# Patient Record
Sex: MALE | Race: WHITE | NOT HISPANIC OR LATINO | Employment: PART TIME | URBAN - METROPOLITAN AREA
[De-identification: names, ages, dates, MRNs, and addresses within clinical notes are randomized per-mention and may not be internally consistent; named-entity substitution may affect disease eponyms.]

---
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
## 2024-01-12 ENCOUNTER — VIRTUAL VISIT (OUTPATIENT)
Dept: FAMILY MEDICINE | Facility: CLINIC | Age: 19
End: 2024-01-12
Payer: COMMERCIAL

## 2024-01-12 DIAGNOSIS — R68.89 EPISODES OF DECREASED ATTENTIVENESS: Primary | ICD-10-CM

## 2024-01-12 PROCEDURE — 99213 OFFICE O/P EST LOW 20 MIN: CPT | Mod: 95 | Performed by: FAMILY MEDICINE

## 2024-01-12 NOTE — PROGRESS NOTES
Jhoan is a 18 year old who is being evaluated via a billable video visit.      How would you like to obtain your AVS? Mail a copy  If the video visit is dropped, the invitation should be resent by: Text to cell phone: [phone]  Will anyone else be joining your video visit? No           Assessment & Plan     Episodes of decreased attentiveness  -Jhoan presented as a virtual visit, wanted to get medications for ADHD.  -He used to have ADHD diagnosis as a kid around 5 years of age, he is not sure what medication he was on.  -For the past 4 to 5 years, he is having issues focusing, racing thoughts, inattentiveness.  -Currently working, work involves computers, cutting trees and part-time in gym.  -Weed use every night.  Denied cigarette smoking or alcohol use.  -Denied anxiety or depression.  Not on any medications.    PLAN:  -Recommended ADHD evaluation first before medications.  -If evaluation comes back positive, he will schedule a visit to discuss medication options.    - Adult Mental Health  Referral; Future      15 minutes spent by me on the date of the encounter doing chart review, patient visit, and documentation        Nicotine/Tobacco Cessation:  He reports that he has been smoking other. He has never used smokeless tobacco.          Dwain Aleman MD  [facility]    Edita Staples is a 18 year old, presenting for the following health issues:  A.D.H.D        1/12/2024    10:43 AM   Additional Questions   Roomed by Liza       History of Present Illness       Reason for visit:  ADHD  Symptom onset:  More than a month  Symptoms include:  Cannot focus on more than one thing, forgetful while talking, switching subjects mid sentence, cannot retain information, mind racing 24/7, cannot sleep takes about 1-1.5 hours to fall asleep, melatonin not very helpful  Symptom intensity:  Severe  Symptom progression:  Worsening  Had these symptoms before:  Yes  Has  tried/received treatment for these symptoms:  Yes  Previous treatment was successful:  No  What makes it worse:  None  What makes it better:  Magnesium    He eats 2-3 servings of fruits and vegetables daily.He consumes 2 sweetened beverage(s) daily.He exercises with enough effort to increase his heart rate 60 or more minutes per day.  He exercises with enough effort to increase his heart rate 7 days per week.   He is taking medications regularly.               Review of Systems   Constitutional, HEENT, cardiovascular, pulmonary, gi and gu systems are negative, except as otherwise noted.      Objective           Vitals:  No vitals were obtained today due to virtual visit.    Physical Exam   GENERAL: Healthy, alert and no distress  EYES: Eyes grossly normal to inspection.  No discharge or erythema, or obvious scleral/conjunctival abnormalities.  RESP: No audible wheeze, cough, or visible cyanosis.  No visible retractions or increased work of breathing.    SKIN: Visible skin clear. No significant rash, abnormal pigmentation or lesions.  NEURO: Cranial nerves grossly intact.  Mentation and speech appropriate for age.  PSYCH: Mentation appears normal, affect normal/bright, judgement and insight intact, normal speech and appearance well-groomed.                Video-Visit Details    Type of service:  Video Visit     Originating Location (pt. Location): Home    Distant Location (provider location):  On-site  Platform used for Video Visit: Swagsy

## 2024-05-13 ENCOUNTER — TRANSFERRED RECORDS (OUTPATIENT)
Dept: HEALTH INFORMATION MANAGEMENT | Facility: CLINIC | Age: 19
End: 2024-05-13

## 2024-05-14 ENCOUNTER — OFFICE VISIT (OUTPATIENT)
Dept: URGENT CARE | Facility: URGENT CARE | Age: 19
End: 2024-05-14
Payer: COMMERCIAL

## 2024-05-14 ENCOUNTER — NURSE TRIAGE (OUTPATIENT)
Dept: NURSING | Facility: CLINIC | Age: 19
End: 2024-05-14

## 2024-05-14 VITALS
TEMPERATURE: 98.6 F | OXYGEN SATURATION: 99 % | RESPIRATION RATE: 16 BRPM | BODY MASS INDEX: 22.3 KG/M2 | DIASTOLIC BLOOD PRESSURE: 80 MMHG | SYSTOLIC BLOOD PRESSURE: 135 MMHG | WEIGHT: 173 LBS | HEART RATE: 76 BPM

## 2024-05-14 DIAGNOSIS — L50.9 HIVES: ICD-10-CM

## 2024-05-14 DIAGNOSIS — T14.8XXA ABRASION: ICD-10-CM

## 2024-05-14 DIAGNOSIS — L50.9 HIVES: Primary | ICD-10-CM

## 2024-05-14 PROCEDURE — 99213 OFFICE O/P EST LOW 20 MIN: CPT | Performed by: PHYSICIAN ASSISTANT

## 2024-05-14 RX ORDER — MUPIROCIN 20 MG/G
OINTMENT TOPICAL 3 TIMES DAILY
Qty: 1 G | Refills: 0 | Status: SHIPPED | OUTPATIENT
Start: 2024-05-14 | End: 2024-05-14

## 2024-05-14 RX ORDER — PREDNISONE 20 MG/1
40 TABLET ORAL DAILY
Qty: 10 TABLET | Refills: 0 | Status: SHIPPED | OUTPATIENT
Start: 2024-05-14 | End: 2024-05-19

## 2024-05-14 RX ORDER — PREDNISONE 20 MG/1
40 TABLET ORAL DAILY
Qty: 10 TABLET | Refills: 0 | Status: SHIPPED | OUTPATIENT
Start: 2024-05-14 | End: 2024-05-14

## 2024-05-14 RX ORDER — CETIRIZINE HYDROCHLORIDE 10 MG/1
10 TABLET ORAL DAILY
Qty: 14 TABLET | Refills: 0 | Status: SHIPPED | OUTPATIENT
Start: 2024-05-14 | End: 2024-05-14

## 2024-05-14 RX ORDER — CETIRIZINE HYDROCHLORIDE 10 MG/1
10 TABLET ORAL DAILY
Qty: 14 TABLET | Refills: 0 | Status: SHIPPED | OUTPATIENT
Start: 2024-05-14 | End: 2024-05-28

## 2024-05-14 RX ORDER — MUPIROCIN 20 MG/G
OINTMENT TOPICAL 3 TIMES DAILY
Qty: 1 G | Refills: 0 | Status: SHIPPED | OUTPATIENT
Start: 2024-05-14 | End: 2024-05-21

## 2024-05-14 ASSESSMENT — ENCOUNTER SYMPTOMS
FEVER: 0
CARDIOVASCULAR NEGATIVE: 1
SHORTNESS OF BREATH: 0
GASTROINTESTINAL NEGATIVE: 1
WOUND: 1
COUGH: 0
ACTIVITY CHANGE: 0
CHEST TIGHTNESS: 0
FATIGUE: 0
COLOR CHANGE: 0

## 2024-05-14 NOTE — PROGRESS NOTES
SUBJECTIVE:   Jhoan Epstein is a 18 year old male presenting with a chief complaint of   Chief Complaint   Patient presents with    Poison Ivy     Both arms and legs; noticed today. Pt was hiking on Sunday and thinks it is poison ivy. Has been spreading and is very itchy.     Abrasion     Rt knee road rash after falling rollerblading Saturday.        He is an established patient of [facility].    Fell on right knee onto pavement and got road rash, he was doing a new trick on his roller blades on Sunday.  He cleans it daily using a warm rag applies neosporin and bandages it. No active bleeding or oozing, or scabbing. No purulent discharge.     Went hiking on Sunday with parents and day thinks he got poision ivy Says he noticed it today, present on legs and thighs and arms. Feels like it is spreading, started on right thigh. It just feels itchy and has had no similar sx in the past.     No treatments tried. No fevers, shortness of breathe, and lip swelling. Denies new detergents, body soaps or new exposures. No one else he went hiking with had similar sx.         Review of Systems   Constitutional:  Negative for activity change, fatigue and fever.   HENT: Negative.     Respiratory:  Negative for cough, chest tightness and shortness of breath.    Cardiovascular: Negative.    Gastrointestinal: Negative.    Skin:  Positive for rash and wound. Negative for color change and pallor.   Allergic/Immunologic: Negative for food allergies.       Past Medical History:   Diagnosis Date    Cellulitis and abscess of buttock 4/5/2011    NO ACTIVE PROBLEMS      Family History   Problem Relation Age of Onset    Heart Disease Mother         SVT had oblation 2013    Cerebrovascular Disease Maternal Grandfather     Heart Disease Maternal Grandfather     Alcohol/Drug Paternal Grandmother     Unknown/Adopted No family hx of     Family History Negative No family hx of     Asthma No family hx of     C.A.D. No family hx of     Diabetes No  family hx of     Hypertension No family hx of     Breast Cancer No family hx of     Cancer - colorectal No family hx of     Prostate Cancer No family hx of     Allergies No family hx of     Alzheimer Disease No family hx of     Anesthesia Reaction No family hx of     Arthritis No family hx of     Blood Disease No family hx of     Cancer No family hx of     Cardiovascular No family hx of     Circulatory No family hx of     Congenital Anomalies No family hx of     Connective Tissue Disorder No family hx of     Depression No family hx of     Endocrine Disease No family hx of     Eye Disorder No family hx of     Genetic Disorder No family hx of     Gastrointestinal Disease No family hx of     Genitourinary Problems No family hx of     Gynecology No family hx of     Lipids No family hx of     Musculoskeletal Disorder No family hx of     Neurologic Disorder No family hx of     Obesity No family hx of     Osteoporosis No family hx of     Psychotic Disorder No family hx of     Respiratory No family hx of     Thyroid Disease No family hx of     Hearing Loss No family hx of     Coronary Artery Disease No family hx of     Hyperlipidemia No family hx of     Ovarian Cancer No family hx of     Depression/Anxiety No family hx of     Thyroid Disease No family hx of     Chemical Addiction No family hx of     Known Genetic Syndrome No family hx of      Current Outpatient Medications   Medication Sig Dispense Refill    cetirizine (ZYRTEC) 10 MG tablet Take 1 tablet (10 mg) by mouth daily for 14 days 14 tablet 0    EPINEPHrine (ANY BX GENERIC EQUIV) 0.3 MG/0.3ML injection 2-pack Inject 0.3 mLs (0.3 mg) into the muscle as needed for anaphylaxis (Patient not taking: Reported on 1/12/2024) 0.6 mL 1    MAGNESIUM OXIDE PO  (Patient not taking: Reported on 1/12/2024)      mupirocin (BACTROBAN) 2 % external ointment Apply topically 3 times daily for 7 days 1 g 0    ondansetron (ZOFRAN ODT) 4 MG ODT tab Take 1 tablet (4 mg) by mouth every 12  hours as needed for nausea or vomiting (Patient not taking: Reported on 1/12/2024) 8 tablet 0    predniSONE (DELTASONE) 20 MG tablet Take 2 tablets (40 mg) by mouth daily for 5 days 10 tablet 0    Probiotic Product (PRO-BIOTIC BLEND PO)  (Patient not taking: Reported on 1/12/2024)       Social History     Tobacco Use    Smoking status: Some Days     Types: Other    Smokeless tobacco: Never    Tobacco comments:     Marijuana   Substance Use Topics    Alcohol use: No       OBJECTIVE  /80 (BP Location: Left arm, Cuff Size: Adult Regular)   Pulse 76   Temp 98.6  F (37  C) (Tympanic)   Resp 16   Wt 78.5 kg (173 lb)   SpO2 99%   BMI 22.30 kg/m      Physical Exam  Constitutional:       General: He is not in acute distress.     Appearance: Normal appearance. He is normal weight. He is not ill-appearing, toxic-appearing or diaphoretic.   HENT:      Head: Normocephalic and atraumatic.      Nose: Nose normal.      Mouth/Throat:      Mouth: Mucous membranes are moist.      Pharynx: Oropharynx is clear.   Eyes:      Extraocular Movements: Extraocular movements intact.      Conjunctiva/sclera: Conjunctivae normal.   Cardiovascular:      Rate and Rhythm: Normal rate and regular rhythm.      Heart sounds: Normal heart sounds.   Pulmonary:      Effort: Pulmonary effort is normal.      Breath sounds: Normal breath sounds.   Musculoskeletal:      Cervical back: Normal range of motion and neck supple.   Skin:     General: Skin is warm.      Findings: Rash present. No bruising, petechiae or wound. Rash is urticarial.      Comments: Rt knee lesion, 2 circular lesions, one is 4cm X 3 cm and 3  cm X 2 cm, Erythematous with yellow crusting around the edges.   Urticaria present at right upper arm and left arm along waist line, lateral torso, right and left leg. Inner thighs bilaterally, across trunk. Lateral thighs. They are varied in size.   No vesicles, excoriations present.    Neurological:      General: No focal deficit  present.      Mental Status: He is alert and oriented to person, place, and time.         Labs:  No results found for this or any previous visit (from the past 24 hour(s)).    X-Ray was not done.    ASSESSMENT:      ICD-10-CM    1. Hives  L50.9 DISCONTINUED: predniSONE (DELTASONE) 20 MG tablet     DISCONTINUED: cetirizine (ZYRTEC) 10 MG tablet      2. Abrasion  T14.8XXA DISCONTINUED: mupirocin (BACTROBAN) 2 % external ointment           Medical Decision Making:    Differential Diagnosis:  Rash:   Contact/irritant  dermatitis  Drug eruption  Impetigo  Poison ivy  Poison oak  Urticaria     Serious Comorbid Conditions:  Adult:   Reviewed     PLAN:    Rash:    Prednisone for 5 days  We discussed starting Zyrtec 10mg daily for about 2 weeks suggested for itchy rash.  We discussed starting topical mupirocin for the lesions present on right knee. Continue to clean the area with warm soap water and pat dry before putting on the mupirocin.   We also discussed that the hives may feel worse with hot water.     Follow up with UC or PCP if sx worsen. You may consider following up with Allergist       Followup:    If not improving or if condition worsens, follow up with your Primary Care Provider.   Go to the ER if you experience SOB or difficulty breathing.    There are no Patient Instructions on file for this visit.

## 2024-05-15 NOTE — TELEPHONE ENCOUNTER
Nurse Triage SBAR    Situation: Medication question    Background: Mother, Kathi, tiff. Patient was seen in urgent care.     Assessment: The pharmacy is closed. RN was able to transfer the 3 prescriptions, Zyrtec, prednisone and Mupirocin to the new pharmacy [facility] in [place].     Protocol Recommended Disposition: Home care/ Telephone Advise    Recommendation: According to the protocol, Patient should do home care. Home care reviewed. Advised Mother that the patient needs to do home care. Home care reviewed. Care advice given. Mother verbalizes understanding and agrees with plan of care.     Kath Knapp RN Nursing Advisor 5/14/2024 7:50 PM     Reason for Disposition   [1] Prescription prescribed recently is not at pharmacy AND [2] triager has access to patient's EMR AND [3] prescription is recorded in the EMR    Protocols used: Medication Refill and Renewal Call-A-

## 2024-05-17 ENCOUNTER — OFFICE VISIT (OUTPATIENT)
Dept: FAMILY MEDICINE | Facility: CLINIC | Age: 19
End: 2024-05-17
Payer: COMMERCIAL

## 2024-05-17 VITALS
DIASTOLIC BLOOD PRESSURE: 76 MMHG | BODY MASS INDEX: 21.14 KG/M2 | TEMPERATURE: 97.3 F | HEART RATE: 84 BPM | OXYGEN SATURATION: 99 % | SYSTOLIC BLOOD PRESSURE: 121 MMHG | HEIGHT: 75 IN | WEIGHT: 170 LBS | RESPIRATION RATE: 16 BRPM

## 2024-05-17 DIAGNOSIS — F90.9 ATTENTION DEFICIT HYPERACTIVITY DISORDER (ADHD), UNSPECIFIED ADHD TYPE: Primary | ICD-10-CM

## 2024-05-17 PROCEDURE — G2211 COMPLEX E/M VISIT ADD ON: HCPCS | Performed by: PHYSICIAN ASSISTANT

## 2024-05-17 PROCEDURE — 99213 OFFICE O/P EST LOW 20 MIN: CPT | Performed by: PHYSICIAN ASSISTANT

## 2024-05-17 ASSESSMENT — PAIN SCALES - GENERAL: PAINLEVEL: NO PAIN (0)

## 2024-05-17 NOTE — PROGRESS NOTES
"  Assessment & Plan       ICD-10-CM    1. Attention deficit hyperactivity disorder (ADHD), unspecified ADHD type  F90.9 Adult Mental Health  Referral      Talk to patient about his concerns he was referred to mental health for possible medication management for ADHD.  Once that is stable I certainly can help with the refills.  The longitudinal plan of care for the diagnosis(es)/condition(s) as documented were addressed during this visit. Due to the added complexity in care, I will continue to support Jhoan in the subsequent management and with ongoing continuity of care.    Edita Staples is a 18 year old, presenting for the following health issues:  Recheck Medication    History of Present Illness       Reason for visit:  Adhd appoinrment    He eats 0-1 servings of fruits and vegetables daily.He consumes 2 sweetened beverage(s) daily.He exercises with enough effort to increase his heart rate 60 or more minutes per day.  He exercises with enough effort to increase his heart rate 4 days per week.   He is taking medications regularly.  Patient is here for follow-up after getting evaluation done for ADHD.  The report was through his phone he had access but no hard copies were reviewed during his visit.  He states he is going to scan those in through [facility] so that are available in his chart.    He has had a long history of difficulty focusing and multitasking and easily distracted.  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  He states is very difficult for him to concentrate.      Review of Systems  Constitutional, HEENT, cardiovascular, pulmonary, gi and gu systems are negative, except as otherwise noted.      Objective    /76   Pulse 84   Temp 97.3  F (36.3  C) (Tympanic)   Resp 16   Ht 1.905 m (6' 3\")   Wt 77.1 kg (170 lb)   SpO2 99%   BMI 21.25 kg/m    Body mass index is 21.25 kg/m .  Physical Exam   GENERAL: alert and no distress  PSYCH: mentation appears normal, " affect normal/bright-fidgety        Signed Electronically by: Zach Perez PA-C

## 2024-06-10 NOTE — PROGRESS NOTES
"  Assessment & Plan     Seborrheic dermatitis  Worsened   - ketoconazole (NIZORAL) 2 % external shampoo; Use daily as needed for dandruff. Apply and wait 5-10 minutes before rinsing.  To use daily as needed until gone. Can use once weekly as maintenance if needed thereafter.  Consider lidex addition if not improving           Follow up if not improving 2-3 weeks     Subjective   Jhoan is a 18 year old, presenting for the following health issues:  Derm Problem        6/14/2024     9:58 AM   Additional Questions   Roomed by Cici Bolanos MA   Accompanied by Self     History of Present Illness       Reason for visit:  Head    He eats 0-1 servings of fruits and vegetables daily.He consumes 2 sweetened beverage(s) daily.He exercises with enough effort to increase his heart rate 30 to 60 minutes per day.  He exercises with enough effort to increase his heart rate 4 days per week.   He is taking medications regularly.         Review of Systems  Constitutional, HEENT, cardiovascular, pulmonary, gi and gu systems are negative, except as otherwise noted.      Objective    /77   Pulse 78   Temp 97.5  F (36.4  C) (Tympanic)   Resp 14   Ht 1.854 m (6' 1\")   Wt 77.1 kg (170 lb)   SpO2 100%   BMI 22.43 kg/m    Body mass index is 22.43 kg/m .  Physical Exam   GENERAL: alert and no distress  EYES: Eyes grossly normal to inspection, PERRL and conjunctivae and sclerae normal  SKIN: Scalp with dandruff and plaques of dried skin with scattered scabs surrounded by mild erythema and dry skin            Signed Electronically by: Brayan Zavala PA-C      "

## 2024-06-14 ENCOUNTER — OFFICE VISIT (OUTPATIENT)
Dept: FAMILY MEDICINE | Facility: CLINIC | Age: 19
End: 2024-06-14
Payer: COMMERCIAL

## 2024-06-14 VITALS
BODY MASS INDEX: 22.53 KG/M2 | DIASTOLIC BLOOD PRESSURE: 77 MMHG | SYSTOLIC BLOOD PRESSURE: 133 MMHG | WEIGHT: 170 LBS | RESPIRATION RATE: 14 BRPM | TEMPERATURE: 97.5 F | OXYGEN SATURATION: 100 % | HEIGHT: 73 IN | HEART RATE: 78 BPM

## 2024-06-14 DIAGNOSIS — L21.9 SEBORRHEIC DERMATITIS: Primary | ICD-10-CM

## 2024-06-14 PROCEDURE — 99213 OFFICE O/P EST LOW 20 MIN: CPT | Performed by: PHYSICIAN ASSISTANT

## 2024-06-14 RX ORDER — KETOCONAZOLE 20 MG/ML
SHAMPOO TOPICAL
Qty: 120 ML | Refills: 1 | Status: SHIPPED | OUTPATIENT
Start: 2024-06-14

## 2024-06-14 ASSESSMENT — PAIN SCALES - GENERAL: PAINLEVEL: NO PAIN (0)

## 2024-06-20 ENCOUNTER — TELEPHONE (OUTPATIENT)
Dept: FAMILY MEDICINE | Facility: CLINIC | Age: 19
End: 2024-06-20

## 2024-06-20 ENCOUNTER — VIRTUAL VISIT (OUTPATIENT)
Dept: FAMILY MEDICINE | Facility: CLINIC | Age: 19
End: 2024-06-20
Payer: COMMERCIAL

## 2024-06-20 DIAGNOSIS — F90.9 ATTENTION DEFICIT HYPERACTIVITY DISORDER (ADHD), UNSPECIFIED ADHD TYPE: Primary | ICD-10-CM

## 2024-06-20 PROCEDURE — 99207 PR NON-BILLABLE SERV PER CHARTING: CPT | Mod: 95 | Performed by: PHYSICIAN ASSISTANT

## 2024-06-20 NOTE — TELEPHONE ENCOUNTER
"Called and spoke with [facility] health services.  They state that patient will be doing a \"bridge of care\" which gets patient stabilized on medication and once he is stabilized he then would return to PCP for medication management. Which they said can take a while.     They also stated that ADHD testing through [place] is booked out a year but they are using outside partners to get patients in, which they said has been successful.     I also called and sent patient [employer] message to explain how to upload a document through [employer].     Alissa CHASE,    [facility]    "

## 2024-06-20 NOTE — PROGRESS NOTES
Jhoan is a 18 year old who is being evaluated via a billable video visit.        Assessment & Plan       ICD-10-CM    1. Attention deficit hyperactivity disorder (ADHD), unspecified ADHD type  F90.9       Talk to patient over video visit regarding his concerns.  It appears there may have been some miscommunication.  He has had his evaluation done were waiting for copies to have updated in his chart to confirm this.  He was to follow-up with mental health department for medication stabilization and then referred back to primary care for continued care.  I will have our team help get him set up for follow-up with mental health department for medication management.      Subjective   Jhoan is a 18 year old, presenting for the following health issues:  Recheck Medication    History of Present Illness       Reason for visit:  Head    He eats 0-1 servings of fruits and vegetables daily.He consumes 2 sweetened beverage(s) daily.He exercises with enough effort to increase his heart rate 30 to 60 minutes per day.  He exercises with enough effort to increase his heart rate 4 days per week.   He is taking medications regularly.  Patient is scheduled for a video visit today to do a follow-up from his ADHD evaluation.  Evaluation form was not available at time of visit.  He is interested in getting started on medication    Review of Systems  Constitutional, HEENT, cardiovascular, pulmonary, gi and gu systems are negative, except as otherwise noted.      Objective           Vitals:  No vitals were obtained today due to virtual visit.    Physical Exam   GENERAL: alert and no distress  EYES: Eyes grossly normal to inspection.  No discharge or erythema, or obvious scleral/conjunctival abnormalities.  RESP: No audible wheeze, cough, or visible cyanosis.    SKIN: Visible skin clear. No significant rash, abnormal pigmentation or lesions.  NEURO: Cranial nerves grossly intact.  Mentation and speech appropriate for age.  PSYCH: Appropriate  affect, tone, and pace of words          Video-Visit Details    Type of service:  Video Visit   Originating Location (pt. Location): Home    Distant Location (provider location):  Off-site  Platform used for Video Visit: [facility]  Signed Electronically by: Zach Perez PA-C

## 2024-06-30 ENCOUNTER — HEALTH MAINTENANCE LETTER (OUTPATIENT)
Age: 19
End: 2024-06-30

## 2024-07-03 ENCOUNTER — TELEPHONE (OUTPATIENT)
Dept: FAMILY MEDICINE | Facility: CLINIC | Age: 19
End: 2024-07-03
Payer: COMMERCIAL

## 2024-07-03 NOTE — TELEPHONE ENCOUNTER
Patient Quality Outreach    Patient is due for the following:   Physical Preventive Adult Physical    Next Steps:   Schedule a Adult Preventative    Type of outreach:    Sent [employer] message.    Next Steps:  Reach out within 90 days via [employer].    Max number of attempts reached: No. Will try again in 90 days if patient still on fail list.    Questions for provider review:    None           Cici Bolanos MA

## 2024-07-08 ENCOUNTER — TELEPHONE (OUTPATIENT)
Dept: FAMILY MEDICINE | Facility: CLINIC | Age: 19
End: 2024-07-08
Payer: COMMERCIAL

## 2024-07-08 NOTE — TELEPHONE ENCOUNTER
"Pt and mom calling regarding medication for ADHD. Mom states that pt has had multiple appointments to start medication for ADHD, but at each appointment is told provider is \"the wrong person to see\". Mom was told that they would be contacted to schedule with \"the right person,\" but have not heard from anyone.     Per review of 6/20 with Zach Perez PA-C, provider notes that pt needs to follow-up with mental health department for medication stabilization, then refer back to primary care for continued care. Finally, provider stated he will have \"team help him get set up for follow-up with mental health department for medication management.\" Primary care RN unable to schedule for mental health department, but provider scheduling number for mom and patient to call and schedule.     Bhargavi Vu RN  "

## 2024-08-05 ENCOUNTER — ANCILLARY PROCEDURE (OUTPATIENT)
Dept: GENERAL RADIOLOGY | Facility: CLINIC | Age: 19
End: 2024-08-05
Payer: COMMERCIAL

## 2024-08-05 ENCOUNTER — OFFICE VISIT (OUTPATIENT)
Dept: URGENT CARE | Facility: URGENT CARE | Age: 19
End: 2024-08-05
Payer: COMMERCIAL

## 2024-08-05 VITALS
HEART RATE: 74 BPM | SYSTOLIC BLOOD PRESSURE: 142 MMHG | RESPIRATION RATE: 20 BRPM | BODY MASS INDEX: 22.72 KG/M2 | WEIGHT: 172.2 LBS | OXYGEN SATURATION: 100 % | TEMPERATURE: 97.1 F | DIASTOLIC BLOOD PRESSURE: 81 MMHG

## 2024-08-05 DIAGNOSIS — M79.645 THUMB PAIN, LEFT: Primary | ICD-10-CM

## 2024-08-05 DIAGNOSIS — S63.602A SPRAIN OF LEFT THUMB, UNSPECIFIED SITE OF DIGIT, INITIAL ENCOUNTER: ICD-10-CM

## 2024-08-05 PROCEDURE — 73130 X-RAY EXAM OF HAND: CPT | Mod: TC | Performed by: RADIOLOGY

## 2024-08-05 PROCEDURE — 99213 OFFICE O/P EST LOW 20 MIN: CPT

## 2024-08-05 NOTE — PROGRESS NOTES
ASSESSMENT:  (M79.645) Thumb pain, left  (primary encounter diagnosis)  Plan: XR Hand Left G/E 3 Views    PLAN:  Inform the patient that the left hand x-ray does not show any fracture or dislocation per the radiologist report.  We discussed the need to rest, ice and elevate the affected extremity for pain.  We also discussed using Tylenol and/or ibuprofen as needed for pain with the maximum dose of Tylenol being 4000 mg in a 24-hour period of time and to take ibuprofen with food to avoid upset stomach.  Informed the patient to perform activity as tolerated.  Discussed the need to return to clinic with any new or worsening symptoms.  Patient acknowledged his understanding of the above plan.    The use of Dragon/PowerMic dictation services may have been used to construct the content in this note; any grammatical or spelling errors are non-intentional. Please contact the author of this note directly if you are in need of any clarification.      Hermilo Hollis, APRN CNP      SUBJECTIVE:  Jhoan Epstein is a 19 year old male who sustained a left thumb injury 2 weeks ago after jamming it while wrestling.   Rated as: mild but worse with certain movements  Described as: pain  Symptoms have been unchanged since that time.   Treatment: none  Prior history of related problems: no prior problems with this area in the past.    ROS:  Negative except noted above.       OBJECTIVE:  Blood pressure (!) 142/81, pulse 74, temperature 97.1  F (36.2  C), temperature source Tympanic, resp. rate 20, weight 78.1 kg (172 lb 3.2 oz), SpO2 100%.  Appearance: in no apparent distress.  Left hand and thumb: no obvious deformity, swelling, or ecchymoses  non-tender throughout  Hand exam: normal exam, no swelling, tenderness, instability; ligaments intact, FROM all hand, wrist, finger joints.    X-ray: Left hand -no fracture or dislocation per the radiologist report.

## 2024-08-05 NOTE — PATIENT INSTRUCTIONS
Left hand x-ray does not show any fracture or dislocation per the radiologist report.  Rest, ice and elevate the affected extremity for pain.  Can use Tylenol and/or ibuprofen as needed for pain.  Maximum dose of Tylenol is 4000mg in a 24 hour period of time.  Take ibuprofen with food to avoid stomach upset.  Activity as tolerated.

## 2024-08-21 ENCOUNTER — VIRTUAL VISIT (OUTPATIENT)
Dept: BEHAVIORAL HEALTH | Facility: CLINIC | Age: 19
End: 2024-08-21
Payer: COMMERCIAL

## 2024-08-21 ENCOUNTER — VIRTUAL VISIT (OUTPATIENT)
Dept: PSYCHIATRY | Facility: CLINIC | Age: 19
End: 2024-08-21
Payer: COMMERCIAL

## 2024-08-21 DIAGNOSIS — F12.20 CANNABIS DEPENDENCE (H): ICD-10-CM

## 2024-08-21 DIAGNOSIS — F90.9 ATTENTION DEFICIT HYPERACTIVITY DISORDER (ADHD), UNSPECIFIED ADHD TYPE: Primary | ICD-10-CM

## 2024-08-21 DIAGNOSIS — F90.2 ATTENTION DEFICIT HYPERACTIVITY DISORDER (ADHD), COMBINED TYPE: Primary | ICD-10-CM

## 2024-08-21 PROCEDURE — 99204 OFFICE O/P NEW MOD 45 MIN: CPT | Mod: 95 | Performed by: PSYCHIATRY & NEUROLOGY

## 2024-08-21 PROCEDURE — 90791 PSYCH DIAGNOSTIC EVALUATION: CPT | Mod: 95 | Performed by: COUNSELOR

## 2024-08-21 PROCEDURE — G2211 COMPLEX E/M VISIT ADD ON: HCPCS | Mod: 95 | Performed by: PSYCHIATRY & NEUROLOGY

## 2024-08-21 RX ORDER — BUPROPION HYDROCHLORIDE 150 MG/1
150 TABLET ORAL EVERY MORNING
Qty: 10 TABLET | Refills: 0 | Status: SHIPPED | OUTPATIENT
Start: 2024-08-21 | End: 2024-08-30 | Stop reason: ALTCHOICE

## 2024-08-21 RX ORDER — BUPROPION HYDROCHLORIDE 300 MG/1
300 TABLET ORAL EVERY MORNING
Qty: 30 TABLET | Refills: 1 | Status: SHIPPED | OUTPATIENT
Start: 2024-08-29 | End: 2024-08-30 | Stop reason: ALTCHOICE

## 2024-08-21 ASSESSMENT — PATIENT HEALTH QUESTIONNAIRE - PHQ9
SUM OF ALL RESPONSES TO PHQ QUESTIONS 1-9: 3
SUM OF ALL RESPONSES TO PHQ QUESTIONS 1-9: 3
10. IF YOU CHECKED OFF ANY PROBLEMS, HOW DIFFICULT HAVE THESE PROBLEMS MADE IT FOR YOU TO DO YOUR WORK, TAKE CARE OF THINGS AT HOME, OR GET ALONG WITH OTHER PEOPLE: SOMEWHAT DIFFICULT
10. IF YOU CHECKED OFF ANY PROBLEMS, HOW DIFFICULT HAVE THESE PROBLEMS MADE IT FOR YOU TO DO YOUR WORK, TAKE CARE OF THINGS AT HOME, OR GET ALONG WITH OTHER PEOPLE: SOMEWHAT DIFFICULT
SUM OF ALL RESPONSES TO PHQ QUESTIONS 1-9: 3
SUM OF ALL RESPONSES TO PHQ QUESTIONS 1-9: 3

## 2024-08-21 ASSESSMENT — COLUMBIA-SUICIDE SEVERITY RATING SCALE - C-SSRS
ATTEMPT LIFETIME: NO
TOTAL  NUMBER OF INTERRUPTED ATTEMPTS LIFETIME: NO
2. HAVE YOU ACTUALLY HAD ANY THOUGHTS OF KILLING YOURSELF?: NO
6. HAVE YOU EVER DONE ANYTHING, STARTED TO DO ANYTHING, OR PREPARED TO DO ANYTHING TO END YOUR LIFE?: NO
1. HAVE YOU WISHED YOU WERE DEAD OR WISHED YOU COULD GO TO SLEEP AND NOT WAKE UP?: YES
1. IN THE PAST MONTH, HAVE YOU WISHED YOU WERE DEAD OR WISHED YOU COULD GO TO SLEEP AND NOT WAKE UP?: NO
TOTAL  NUMBER OF ABORTED OR SELF INTERRUPTED ATTEMPTS LIFETIME: NO

## 2024-08-21 ASSESSMENT — PAIN SCALES - GENERAL: PAINLEVEL: NO PAIN (0)

## 2024-08-21 NOTE — PROGRESS NOTES
"    [facility] [place]       PATIENT'S NAME: Jhoan Epstein  PREFERRED NAME: Jhoan  PRONOUNS:       MRN: [id number]  : 2005  ADDRESS: [address] [place] [address]  [facility]  [id number]  DATE OF SERVICE: 24  START TIME: 1001am  END TIME: 1024am  PREFERRED PHONE: [phone]  May we leave a program related message: Yes  EMERGENCY CONTACT: was obtained     SHUBHAM MARTINEZ (Mother)  [phone] (Mobile)    .  SERVICE MODALITY:  Video Visit:      Provider verified identity through the following two step process.  Patient provided:  Patient  and Patient address    Telemedicine Visit: The patient's condition can be safely assessed and treated via synchronous audio and visual telemedicine encounter.      Reason for Telemedicine Visit: Services only offered telehealth    Originating Site (Patient Location): Patient's home    Distant Site (Provider Location): Provider Remote Setting- Home Office    Consent:  The patient/guardian has verbally consented to: the potential risks and benefits of telemedicine (video visit) versus in person care; bill my insurance or make self-payment for services provided; and responsibility for payment of non-covered services.     Patient would like the video invitation sent by:  My Chart    Mode of Communication:  Video Conference via Amwell    Distant Location (Provider):  Off-site    As the provider I attest to compliance with applicable laws and regulations related to telemedicine.    UNIVERSAL ADULT Mental Health DIAGNOSTIC ASSESSMENT    Identifying Information:  Patient is a 19 year old,    individual.  Patient was referred for an assessment by primary care provider .  Patient attended the session alone.    Chief Complaint:   The reason for seeking services at this time is: \"ADHD \".  The problem(s) began years ago  .    Patient has attempted to resolve these concerns in the past through previous ADHD " meds .    Social/Family History:  Patient reported they grew up in  [place]  .  They were raised by  biological parents who were always together  .  Patient reported that their childhood was nice, good environment. Pt has one older brother who is 23   Patient described their current relationships with family of origin as good relationship with family    The patient describes their cultural background as   .  Cultural influences and impact on patient's life structure, values, norms, and healthcare: N/a .  Contextual influences on patient's health include: N/a.    These factors will be addressed in the Preliminary Treatment plan. Patient identified their preferred language to be English . Patient reported they does not need the assistance of an  or other support involved in therapy.     Patient reported had no significant delays in developmental tasks.   Patient's highest education level was completed high school and certification for    .  Patient identified the following learning problems: attention, concentration, and 504 all of schooling .  Modifications will not be used to assist communication in therapy.  Patient reports they are  able to understand written materials but does have a significant learning disability.    Patient reported the following relationship history never .  Patient's current relationship status is partnered with girlfriend of 9 months.   Patient identified their sexual orientation as heterosexual. .  Patient reported having 0  child(darcy). Patient identified family and girlfriend  as part of their support system.  Patient identified the quality of these relationships as good.      Patient's current living/housing situation involves  lives alone as he just moved out from his parents house.  The immediate members of family and household include Kathi (mom) and they report that housing is stable.    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  Patient reports their finances are obtained through savings . Patient does  identify finances as a current stressor.      Patient reported that they   been involved with the legal system.  Charged counter fitting, replica gun BB possession, and theft.  .Pt is currently under probation [facility] 5 months supervised and then another 7 months unsupervised    Patient's Strengths and Limitations:  Patient identified the following strengths or resources that will help them succeed in treatment: friends / good social support, insight, intelligence, and motivation. Things that may interfere with the patient's success in treatment include: none identified.     Assessments:  The following assessments were completed by patient for this visit:  PHQ2:       1/12/2024    10:43 AM 10/30/2023     2:59 PM 8/18/2022     4:04 PM 2/18/2020     3:26 PM 5/21/2019     3:48 PM   PHQ-2 ( 1999 [employer])   Q1: Little interest or pleasure in doing things 1 0 0 0 0   Q2: Feeling down, depressed or hopeless 0 0 0 0 0   PHQ-2 Score 1 0 0 0 0   PHQ-2 Total Score (12-17 Years)- Positive if 3 or more points; Administer PHQ-A if positive    0 0   Q1: Little interest or pleasure in doing things Several days Not at all Not at all    Not at all     Q2: Feeling down, depressed or hopeless Not at all Not at all Not at all    Not at all     PHQ-2 Score 1 0 0    0       PHQ9:       8/21/2024     9:47 AM   PHQ-9 SCORE   PHQ-9 Total Score MyChart 3 (Minimal depression)   PHQ-9 Total Score 3    3     GAD2:       8/21/2024     9:48 AM   TRISHA-2   Feeling nervous, anxious, or on edge 0   Not being able to stop or control worrying 0   TRISHA-2 Total Score 0    0     GAD7:        No data to display              CAGE-AID:       8/21/2024     9:49 AM   CAGE-AID Total Score   Total Score 1    1   Total Score MyChart 1 (A total score of 2 or greater is considered clinically significant)     PROMIS 10-Global Health (only subscores and  total score):       8/21/2024     9:58 AM   PROMIS-10 Scores Only   Global Mental Health Score 15    15   Global Physical Health Score 18    18   PROMIS TOTAL - SUBSCORES 33    33     [place] Suicide Severity Rating Scale (Lifetime/Recent)      8/21/2024    10:36 AM   [place] Suicide Severity Rating (Lifetime/Recent)   Q1 Wish to be Dead (Lifetime) Y   1. Wish to be Dead (Past 1 Month) N   Q2 Non-Specific Active Suicidal Thoughts (Lifetime) N   Actual Attempt (Lifetime) N   Has subject engaged in non-suicidal self-injurious behavior? (Lifetime) N   Interrupted Attempts (Lifetime) N   Aborted or Self-Interrupted Attempt (Lifetime) N   Preparatory Acts or Behavior (Lifetime) N   Calculated C-SSRS Risk Score (Lifetime/Recent) No Risk Indicated       Personal and Family Medical History:  Patient   report a family history of mental health concerns.  Patient reports family history includes Alcohol/Drug in his paternal grandmother; Cerebrovascular Disease in his maternal grandfather; Heart Disease in his maternal grandfather and mother..     Patient does report Mental Health Diagnosis and/or Treatment.  Patient reported the following previous diagnoses which include(s): ADHD, reading learning disability  .  Patient reported symptoms began childhood.  Patient has received mental health services in the past:  psychiatry   .  Psychiatric Hospitalizations: 0  Patient denies a history of civil commitment.      Currently, patient   is receiving other mental health services.  These include none.       Patient has had a physical exam to rule out medical causes for current symptoms.  Date of last physical exam was within the past year. Client was encouraged to follow up with PCP if symptoms were to develop. The patient has a [facility] Provider, who is named Paula Spencer..  Patient reports no current medical concerns.  Patient denies any issues with pain..   There are not significant appetite / nutritional concerns  / weight changes.   Patient does report a history of head injury / trauma / cognitive impairment.  One concussion from ice skating    Current Outpatient Medications   Medication Sig Dispense Refill    EPINEPHrine (ANY BX GENERIC EQUIV) 0.3 MG/0.3ML injection 2-pack Inject 0.3 mLs (0.3 mg) into the muscle as needed for anaphylaxis 0.6 mL 1    ketoconazole (NIZORAL) 2 % external shampoo Use daily as needed for dandruff. Apply and wait 5-10 minutes before rinsing. 120 mL 1    MAGNESIUM OXIDE PO       ondansetron (ZOFRAN ODT) 4 MG ODT tab Take 1 tablet (4 mg) by mouth every 12 hours as needed for nausea or vomiting 8 tablet 0    Probiotic Product (PRO-BIOTIC BLEND PO)        No current facility-administered medications for this visit.     Not sure about hx of med hx focalin and maybe ritalin  Tried 3-4    Medication Adherence:  Patient reports  .  taking prescribed medications as prescribed.    Patient Allergies:    Allergies   Allergen Reactions    Bactrim [Sulfamethoxazole W-Trimethoprim]      Fever happened all 3 times he was on the Bactrim ; vomited    Bee Venom        Medical History:    Past Medical History:   Diagnosis Date    Cellulitis and abscess of buttock 4/5/2011    NO ACTIVE PROBLEMS          Current Mental Status Exam:   Appearance:  Appropriate    Eye Contact:  Good   Psychomotor:  Normal       Gait / station:  no problem  Attitude / Demeanor: Cooperative   Speech      Rate / Production: Normal/ Responsive      Volume:  Normal  volume      Language:  intact  Mood:   Anxious   Affect:   Appropriate    Thought Content: Clear   Thought Process: Coherent  Goal Directed       Associations: No loosening of associations  Insight:   Good   Judgment:  Intact   Orientation:  Person Place Time Situation All  Attention/concentration: Good    Substance Use:   Patient did not report a family history of substance use concerns; see medical history section for details.  Patient has not received chemical dependency  treatment in the past.  Patient has not ever been to detox.      Patient is not currently receiving any chemical dependency treatment.           Substance History of use Age of first use Date of last use     Pattern and duration of use (include amounts and frequency)   Alcohol         Once a month, a few drinks   Cannabis         Daily, flower and vape/carts.  Now just flower as of 3 days ago  Smoking the last 3 years daily  5 grams a week     Amphetamines         REPORTS SUBSTANCE USE: N/A   Cocaine/crack            REPORTS SUBSTANCE USE: N/A   Hallucinogens           REPORTS SUBSTANCE USE: N/A   Inhalants           REPORTS SUBSTANCE USE: N/A   Heroin           REPORTS SUBSTANCE USE: N/A   Other Opiates       REPORTS SUBSTANCE USE: N/A   Benzodiazepine         REPORTS SUBSTANCE USE: N/A   Barbiturates       REPORTS SUBSTANCE USE: N/A   Over the counter meds       REPORTS SUBSTANCE USE: N/A   Caffeine       daily   Nicotine        REPORTS SUBSTANCE USE: N/A   Other substances not listed above:  Identify:        REPORTS SUBSTANCE USE: N/A     Patient reported the following problems as a result of their substance use: No problems .    Substance Use: No symptoms    Based on the negative CAGE score and clinical interview there  are not indications of drug or alcohol abuse.    Significant Losses / Trauma / Abuse / Neglect Issues:   Patient did not   serve in the .  There are indications or report of significant loss, trauma, abuse or neglect issues related to: Pt identifies his engagement with police interactions were traumatic   Concerns for possible neglect are not present.     Safety Assessment:   Patient denies current homicidal ideation and behaviors.  Patient denies current self-injurious ideation and behaviors.    Patient denied risk behaviors associated with substance use.   Patient reported impulsive decisionmaking reported substance use associated with mental health symptoms.  Patient reports the  following current concerns for their personal safety: None.  Patient reports there are not  firearms in the house.       .    History of Safety Concerns:  Patient denied a history of homicidal ideation.     Patient denied a history of personal safety concerns.    Patient denied a history of assaultive behaviors.    Patient denied a history of sexual assault behaviors.     Patient denied a history of risk behaviors associated with substance use.  Patient reported a history of impulsive decision making reported a history of substance use associated with mental health symptoms.  Patient reports the following protective factors:  forward or future oriented thinking; dedication to family or friends; safe and stable environment; regular sleep; regular physical activity; secure attachment; help seeking behaviors when distressed; abstinence from substances; adherence with prescribed medication; living with other people; daily obligations; structured day; effective problem solving skills; commitment to well being; positive social skills; healthy fear of risky behaviors or pain; financial stability; access to a variety of clinical interventions and pets      Risk Plan:  See Recommendations for Safety and Risk Management Plan    Review of Symptoms per patient report:   Depression: No symptoms  Denies suicide attempts, self harm behavior  Hx of passive suicidal ideation, no plan/intent  Carlota:  No Symptoms  Psychosis: No Symptoms  Anxiety: Excessive worry, Nervousness, Sleep disturbance, Ruminations, Poor concentration, and Irritability  Panic:  No symptoms  Post Traumatic Stress Disorder:  No Symptoms   Eating Disorder: No Symptoms  ADD / ADHD:  Inattentive, Difficulties listening, Poor task completion, Poor organizational skills, Distractibility, Forgetful, Impulsive, and Restlessness/fidgety  Conduct Disorder: No symptoms  Autism Spectrum Disorder: No symptoms  Obsessive Compulsive Disorder: No Symptoms    Current Stressors  / Issues:  MH update:  ROS above  Stresses: on going probation/legal.  Attempting to find new job.  Reading learning disability.  Appetite: n/a  Sleep: Poor sleep  Outpatient Provider updates: Denies current or need.  Testing completed 5/23  SI/SIB/HI:  Remote hx of passive ideation.  No previous attempts, self harm etc.    ABDON:  Daily THC use- 5 grams a week  Side effects/compliance:  Interventions: [facility] [place] engaged in completing DA with psychoeducation and tx planning  Most important: ADHD meds  Patient reports the following compulsive behaviors and treatment history:  n/a .      Diagnostic Criteria:   Attention Deficit Hyperactivity Disorder  A) A persistent pattern of inattention and/or hyperactivity-impulsivity that interferes with functioning or development, as characterized by (1) Inattention and/or (2) Hyperactivity and Impulsivity  (1) Inattention: 6 or more of the following symptoms have persisted for at least 6 months to a degree that is inconsistent with developmental level and that negatively impacts directly on social and academic/occupational activities:  - Often fails to give close attention to details or makes careless mistakes in schoolwork, at work, or during other activities  - Often has difficulty sustaining attention in tasks or play activities  - Often does not seem to listen when spoken to directly  - Often does not follow through on instructions and fails to finish schoolwork, chores, or duties in the workplace  - Often has difficulty organizing tasks and activities  - Often avoids, dislikes, or is reluctant to engage in tasks that require sustained mental effort  - Often loses things necessary for tasks or activities  - Is often easily distractedby extraneous stimuli  - Is often forgetful in daily activities  (2) Hyeractivity and Impulsivity: 6 or more of the following symptoms have persisted for at least 6 months to a degree that is inconsistent with developmental level and that negatively  "impacts directly on social and academic/occupational activities:  - Often fidgets with or taps hands or feet or squirms in seat  - Often leaves seat in situations when remaining seated is expected  - Often runs about or climbs in situationswhere it is inappropriate  - Often unable to play or engage in leisure activities quietly  - Is often \"on the go,\" acting as if \"driven by a motor\"  - Often talks excessively  - Often blurts out an answer before a question has been completed  - Often has difficulty waiting his or her turn  - Often interrupts or intrudes on others  B) Several inattentive or hyperactive-impulsive symptoms were present prior to age 11 years    Functional Status:  Patient reports the following functional impairments:  chronic disease management, health maintenance, management of the household and or completion of tasks, organization, relationship(s), self-care, and work / vocational responsibilities.     Nonprogrammatic care:  Patient is requesting basic services to address current mental health concerns.    Clinical Summary:  1. Psychosocial, Cultural and Contextual Factors: legal concerns  .  2. Principal DSM5 Diagnoses  (Sustained by DSM5 Criteria Listed Above):   Attention-Deficit/Hyperactivity Disorder  314.01 (F90.2) Combined presentation.  3. Other Diagnoses that is relevant to services:   Substance-Related & Addictive Disorders 292.9 (F12.99) Unspecified Cannabis Related Disorder.  4. Provisional Diagnosis:  n/a  5. Prognosis: Relieve Acute Symptoms.  6. Likely consequences of symptoms if not treated: decompensation of MH.  7. Client strengths include:  empathetic, goal-focused, good listener, insightful, intelligent, and motivated .     Recommendations:     1. Plan for Safety and Risk Management:   Safety and Risk: Recommended that patient call 911 or go to the local ED should there be a change in any of these risk factors..          Report to child / adult protection services was NA.     2. " Patient's identified mental health concerns with a cultural influence will be addressed by per Pt request  .     3. Initial Treatment will focus on:    Alcohol / Substance Use - harm reduction THC  Attentional Problems - . .     4. Resources/Service Plan:    services are not indicated.   Modifications to assist communication are not indicated.   Additional disability accommodations are not indicated.      5. Collaboration:   Collaboration / coordination of treatment will be initiated with the following  support professionals: psychiatry.      6.  Referrals:   The following referral(s) will be initiated: Psychiatry.       A Release of Information has been obtained for the following:  n/a .     Clinical Substantiation/medical necessity for the above recommendations:  Pt has a hx of ADHD symptoms that are impacting daily functioning in daily living and social settings. Through receiving support through CCPS model for medication and [facility] checking on use of coping skills and therapy to help combat these symptoms may provide Pt with relief. Pt reports that they are struggling to manage ADHD symptoms and again CCPS model can assist with providing coping skills, following up that pt is using these skills, safety plan or other interventions along with medication to have the best impact to manage symptoms and provide relief. At this time pt's symptoms are able to be managed with OP services and pt will be referred to a higher level of care if there are abrupt changes in presentation or risk of harm  .    7. ABDON:    ABDON:  Discussed the general effects of drugs and alcohol on health and well-being and Attempt harm reduction by reduce use of THC . Provider gave patient printed information about the  effects of chemical use on their health and well being. Recommendations:  Reduce THC use .     8. Records:   These were reviewed at time of assessment.   Information in this assessment was obtained from the medical record  and  provided by patient who is a good historian.    Patient will have open access to their mental health medical record.    9.   Interactive Complexity: No    10. Safety Plan:   Pt reports a remote hx of passive fleeting suicidal ideation without specific plan or intent years ago.  Pt denies any currently.  Pt denies the need for safety plan/crisis resources.    Provider Name/ Credentials:  Puja Lees MA [facility]  August 21, 2024

## 2024-08-21 NOTE — PROGRESS NOTES
"Virtual Visit Details    Type of service:  Video Visit     Originating Location (pt. Location): {video visit patient location:659403::\"Home\"}  {PROVIDER LOCATION On-site should be selected for visits conducted from your clinic location or adjoining [facility] hospital, academic office, or other nearby [facility] building. Off-site should be selected for all other provider locations, including home:[id number]}  Distant Location (provider location):  {virtual location provider:[id number]}  Platform used for Video Visit: {Virtual Visit Platforms:979994::\"DiscoveRX\"}  "

## 2024-08-21 NOTE — PATIENT INSTRUCTIONS
Treatment Plan:  Start Wellbutrin XL for mood, ADHD: Take 150 mg daily for about 1 week then can increase up to 300 mg daily as tolerated.  Continue to work on goal of abstinence from cannabis.  Recommend cessation of all mood altering substances.  Let us know if you decide you would like chemical dependency resources for assistance with cessation of cannabis.  Strongly recommend individual psychotherapy for additional support and ongoing development of nonpharmacologic coping skills and strategies.  Continue all other cares per primary care provider.   Continue all other medications as reviewed per electronic medical record today.   Safety plan reviewed. To the Emergency Department as needed or call after hours crisis line at [phone] or [phone]. [place] Crisis Text Line: Text [place] to 175069  or  Suicide LifeLine Chat: suicidepre[employer].org/chat  Schedule an appointment with me in 6 weeks or sooner as needed.  Call [facility] at [phone] to schedule.  Follow up with primary care provider as planned or sooner if needed for acute medical concerns.  Call the psychiatric nurse line with medication questions or concerns at [phone].  [employer] may be used to communicate with your provider, but this is not intended to be used for emergencies.    Patient Education:  Good ADHD resources:  Books-Mastering Adult ADHD, Driven to Distraction, Take Charge of Adult ADHD  Website-[email or web address]    Care team has reviewed attendance agreement with patient. Patient advised that two failed appointments within 6 months may lead to termination of current episode of care.     Community Resources:    National Suicide Prevention Lifeline: 638.636.2468 (TTY: 736.325.1417). Call anytime for help.  (www.suicidepreventionlifeline.org)  National [place] [employer] Mental Illness (www.[place].org): 349.627.6658 or 386-336-4490.   Mental Health Association (www.mentalhealth.org): 152.455.1873 or  "449.937.3896.  [place] Crisis Text Line: Text [place] to 871385  Suicide LifeLine Chat: suicidepreventionlifeline.org/chat    Patient Education   Collaborative Care Psychiatry Service  What to Expect  Here's what to expect from your Collaborative Care Psychiatry Service (CCPS).   About CCPS  CCPS means 2 people work together to help you get better. You'll meet with a behavioral health clinician and a psychiatric doctor. A behavioral health clinician helps people with mental health problems by talking with them. A psychiatric doctor helps people by giving them medicine.  How it works  At every visit, you'll see the behavioral health clinician (BHC) first. They'll talk with you about how you're doing and teach you how to feel better.   Then you'll see the psychiatric doctor. This doctor can help you deal with troubling thoughts and feelings by giving you medicine. They'll make sure you know the plan for your care.   CCPS usually takes 3 to 6 visits. If you need more visits, we may have you start seeing a different psychiatric doctor for ongoing care.  If you have any questions or concerns, we'll be glad to talk with you.  About visits  Be open  At your visits, please talk openly about your problems. It may feel hard, but it's the best way for us to help you.  Cancelling visits  If you can't come to your visit, please call us right away at [phone]. If you don't cancel at least 24 hours (1 full day) before your visit, that's \"late cancellation.\"  Being late to visits  Being very late is the same as not showing up. You will be a \"no show\" if:  Your appointment starts with a BHC, and you're more than 15 minutes late for a 30-minute (half hour) visit. This will also cancel your appointment with the psychiatric doctor.  Your appointment is with a psychiatric doctor only, and you're more than 15 minutes late for a 30-minute (half hour) visit.  Your appointment is with a psychiatric doctor only, and you're more than 30 " minutes late for a 60-minute (full hour) visit.  If you cancel late or don't show up 2 times within 6 months, we may end your care.   Getting help between visits  If you need help between visits, you can call us Monday to Friday from 8 a.m. to 4:30 p.m. at [phone].  Emergency care  Call 911 or go to the nearest emergency department if your life or someone else's life is in danger.  Call 988 anytime to reach the national Suicide and Crisis hotline.  Medicine refills  To refill your medicine, call your pharmacy. You can also call [facility]'s Behavioral Access at [phone], Monday to Friday, 8 a.m. to 4:30 p.m. It can take 1 to 3 business days to get a refill.   Forms, letters, and tests  You may have papers to fill out, like FMLA, short-term disability, and workability. We can help you with these forms at your visits, but you must have an appointment. You may need more than 1 visit for this, to be in an intensive therapy program, or both.  Before we can give you medicine for ADHD, we may refer you to get tested for it or confirm it another way.  We may not be able to give you an emotional support animal letter.  We don't do mental health checks ordered by the court.   We don't do mental health testing, but we can refer you to get tested.   Thank you for choosing us for your care.  For informational purposes only. Not to replace the advice of your health care provider. Copyright   2022 [facility]. All rights reserved. [employer] 756995 - 12/22.

## 2024-08-21 NOTE — PROGRESS NOTES
"Telemedicine Visit: The patient's condition can be safely assessed and treated via synchronous audio and visual telemedicine encounter.      Reason for Telemedicine Visit: Patient has requested telehealth visit    Originating Site (Patient Location): Patient's home    Distant Location (provider location):  Off-site    Consent:  The patient/guardian has verbally consented to: the potential risks and benefits of telemedicine (video visit) versus in person care; bill my insurance or make self-payment for services provided; and responsibility for payment of non-covered services.     Mode of Communication:  Video Conference via Acer    As the provider I attest to compliance with applicable laws and regulations related to telemedicine.                                              Outpatient Psychiatric Evaluation- Standard  Adult    Name:  Jhoan Epstein  : 2005    Source of Referral:  Primary Care Provider: Paula Spencer MD   Current Psychotherapist: None     Per primary care provider referral 2024:  My Clinical Question Is: new Dx of ADHD       Identifying Data:  Patient is a 19 year old, partnered / significant other  White Not  or  who presents for initial visit with me.  Patient is currently unemployed. Patient attended the phone/video session alone. Patient prefers to be called: \"Jhoan\"    Chief Complaint:  Patient presents with:  Consult      HPI:  Jhoan Epstein is a 19 year old with past history including ADHD and cannabis dependence who presents today for psychiatric assessment.     Patient with history of ADHD struggles dating back to adolescence. Had IEP/504 plan in school.  Also history of learning/reading disorder.  Patient currently unemployed and recently moved out of his parent's home.  Patient looking for a new job.  Would like to address ADHD symptoms today.  Struggles with inattention, listening, task completion, organization.  Is often distractible and forgetful. "  Can be impulsive and quite restless/fidgety.  Patient does report to daily cannabis use.  Using at least 5 g weekly.  Reports he has recently felt better the last couple days due to decreasing amount of cannabis used.  Feels like he has less brain fog.  Feels that cannabis can often help with boredom and improve his mood.  Also helps with sleep.  He does report some of the negative factors being brain fog, motivation struggles, and long/smoke concerns with ongoing use.  Patient would like to discontinue.  Patient does go to the gym on a regular basis.  Has had some legal troubles and is currently on probation.  Sleep is tough-often has racing thoughts and difficulty falling asleep.  No acute safety concerns.  No SI.  No problematic drug or alcohol use.  Only taking doxylamine succinate over-the-counter as needed for sleep.  Has not found it very effective.    Psych Meds at Intake:  None  Doxylamine succinate prn for sleep (OTC) - not very effective    Past Psych Meds:  Focalin  Focalin XR  Concerta  Ritalin LA  hydroxyzine    Per [facility] Puja Lees [facility], during today's team-based visit:  MH update:  ROS   Stresses: on going probation/legal.  Attempting to find new job.  Reading learning disability.  Appetite: n/a  Sleep: Poor sleep  Outpatient Provider updates: Denies current or need.  Testing completed 5/23  SI/SIB/HI:  Remote hx of passive ideation.  No previous attempts, self harm etc.    ABDON:  Daily THC use- 5 grams a week  Side effects/compliance:  Interventions: [facility] engaged in completing DA with psychoeducation and tx planning  Most important: ADHD meds  Patient reports the following compulsive behaviors and treatment history:  n/a .       Past diagnoses include: ADHD, cannabis dependence  Current medications include: has a current medication list which includes the following prescription(s): epinephrine, ketoconazole, magnesium oxide, ondansetron, and probiotic product.   Medication side effects:  NA  Current  stressors include: Symptoms and see HPI above  Coping mechanisms and supports include: Drugs/Alcohol, Family, Hobbies, and Friends    Psychiatric Review of Symptoms Per [facility] Puja Lees [facility], during today's team-based visit:  Depression:     No symptoms  Denies suicide attempts, self harm behavior  Hx of passive suicidal ideation, no plan/intent  Carlota:             No Symptoms  Psychosis:       No Symptoms  Anxiety:           Excessive worry, Nervousness, Sleep disturbance, Ruminations, Poor concentration, and Irritability  Panic:              No symptoms  Post Traumatic Stress Disorder:  No Symptoms   Eating Disorder:          No Symptoms  ADD / ADHD:              Inattentive, Difficulties listening, Poor task completion, Poor organizational skills, Distractibility, Forgetful, Impulsive, and Restlessness/fidgety  Conduct Disorder:       No symptoms  Autism Spectrum Disorder:     No symptoms  Obsessive Compulsive Disorder:       No Symptoms    All other ROS negative.     PHQ-9 scores:       8/21/2024     9:47 AM   PHQ-9 SCORE   PHQ-9 Total Score MyChart 3 (Minimal depression)   PHQ-9 Total Score 3    3       TRISHA-7 scores:         No data to display                Medical Review of Systems:  10 systems (general, cardiovascular, respiratory, eyes, ENT, endocrine, GI, , M/S, neurological) were reviewed. Most pertinent finding(s) is/are: denies fever, cough, persistent headaches, shortness of breath, chest pain, severe GI symptoms, trouble urinating, severe pain. The remaining systems are all unremarkable.    A 12-item WHODAS 2.0 assessment was not completed.    Psychiatric History:   Hospitalizations: None  History of Commitment? No   Past Treatment: medication(s) from physician / PCP  Suicide Attempts: No   Current Suicide Risk: Suicide Assessment Completed Today.  Self-injurious Behavior: Denies  Electroconvulsive Therapy (ECT) or Transcranial Magnetic Stimulation (TMS): No   GeneSight Genetic Testing: No     Past  medication trials include but are not limited to:   Psych Meds at Intake:  None  Doxylamine succinate prn for sleep (OTC)    Past Psych Meds:  Focalin  Focalin XR  Concerta  Ritalin LA  hydroxyzine    Substance Use History:  Current Use of Drugs/Alcohol: Alcohol - a few drinks once a month or so;  Cannabis  - at least 5 grams a week of flower (also occasional concentrate use and cartridge use) - cut back use three days ago and working towards cessation as goal  Past Use of Drugs/Alcohol: daily cannabis use for at least 3 years - longest period of sobriety about 1 week  Patient reported the following problems as a result of cannabis use academic and occupational / vocational problems.   Patient has not received chemical dependency treatment in the past  Recovery Programming Involvement: None    Tobacco use: Yes     Based on the clinical interview, there  are indications of drug or alcohol abuse. -Cannabis dependence . Continue to monitor.   Discussed effect of substance use on overall health.     Past Medical History:  Past Medical History:   Diagnosis Date    Cellulitis and abscess of buttock 4/5/2011    NO ACTIVE PROBLEMS       Surgery:   Past Surgical History:   Procedure Laterality Date    ARTHROSCOPY KNEE WITH MENISCAL REPAIR Left 8/27/2020    Procedure: REPAIR, Lateral MENISCUS, KNEE, ARTHROSCOPIC , fibrin clot enhancement of repair;  Surgeon: Khari Fuller MD;  Location: [facility] OR    NO HISTORY OF SURGERY       Food and Medicine Allergies:     Allergies   Allergen Reactions    Bactrim [Sulfamethoxazole W-Trimethoprim]      Fever happened all 3 times he was on the Bactrim ; vomited    Bee Venom      Seizures or Head Injury: No  Diet:  not discussed  Exercise: goes to gym on regular basis  Supplements: Reviewed per Electronic Medical Record Today    Current Medications:    Current Outpatient Medications:     EPINEPHrine (ANY BX GENERIC EQUIV) 0.3 MG/0.3ML injection 2-pack, Inject 0.3 mLs (0.3 mg) into the  muscle as needed for anaphylaxis, Disp: 0.6 mL, Rfl: 1    ketoconazole (NIZORAL) 2 % external shampoo, Use daily as needed for dandruff. Apply and wait 5-10 minutes before rinsing., Disp: 120 mL, Rfl: 1    MAGNESIUM OXIDE PO, , Disp: , Rfl:     ondansetron (ZOFRAN ODT) 4 MG ODT tab, Take 1 tablet (4 mg) by mouth every 12 hours as needed for nausea or vomiting, Disp: 8 tablet, Rfl: 0    Probiotic Product (PRO-BIOTIC BLEND PO), , Disp: , Rfl:     Vital Signs:  None since this is a phone/video visit.     Labs:  Most recent laboratory results reviewed and the pertinent results include:   No visits with results within 1 Year(s) from this visit.   Latest known visit with results is:   Office Visit on 06/29/2023   Component Date Value Ref Range Status    Group A Strep antigen 06/29/2023 Negative  Negative Final    Group A strep by PCR 06/29/2023 Not Detected  Not Detected Final     No EKG on file.     Family History:   Patient reported family history includes:   Family History   Problem Relation Age of Onset    Heart Disease Mother         SVT had oblation 2013    Cerebrovascular Disease Maternal Grandfather     Heart Disease Maternal Grandfather     Alcohol/Drug Paternal Grandmother     Unknown/Adopted No family hx of     Family History Negative No family hx of     Asthma No family hx of     C.A.D. No family hx of     Diabetes No family hx of     Hypertension No family hx of     Breast Cancer No family hx of     Cancer - colorectal No family hx of     Prostate Cancer No family hx of     Allergies No family hx of     Alzheimer Disease No family hx of     Anesthesia Reaction No family hx of     Arthritis No family hx of     Blood Disease No family hx of     Cancer No family hx of     Cardiovascular No family hx of     Circulatory No family hx of     Congenital Anomalies No family hx of     Connective Tissue Disorder No family hx of     Depression No family hx of     Endocrine Disease No family hx of     Eye Disorder No  family hx of     Genetic Disorder No family hx of     Gastrointestinal Disease No family hx of     Genitourinary Problems No family hx of     Gynecology No family hx of     Lipids No family hx of     Musculoskeletal Disorder No family hx of     Neurologic Disorder No family hx of     Obesity No family hx of     Osteoporosis No family hx of     Psychotic Disorder No family hx of     Respiratory No family hx of     Thyroid Disease No family hx of     Hearing Loss No family hx of     Coronary Artery Disease No family hx of     Hyperlipidemia No family hx of     Ovarian Cancer No family hx of     Depression/Anxiety No family hx of     Thyroid Disease No family hx of     Chemical Addiction No family hx of     Known Genetic Syndrome No family hx of      Mental Illness History: Denies  Substance Abuse History: Yes: grandfather ETOH  Suicide History: Denies  Medications: Unknown     Social History Per [facility] Puja Lees [facility], during today's team-based visit:  Patient reported they grew up in  [place]  .  They were raised by  biological parents who were always together  .  Patient reported that their childhood was nice, good environment. Pt has one older brother who is 23   Patient described their current relationships with family of origin as good relationship with family     The patient describes their cultural background as   .  Cultural influences and impact on patient's life structure, values, norms, and healthcare: N/a .  Contextual influences on patient's health include: N/a.    These factors will be addressed in the Preliminary Treatment plan. Patient identified their preferred language to be English . Patient reported they does not need the assistance of an  or other support involved in therapy.      Patient reported had no significant delays in developmental tasks.   Patient's highest education level was completed high school and certification for    .  Patient identified the  following learning problems: attention, concentration, and 504 all of schooling .  Modifications will not be used to assist communication in therapy.  Patient reports they are  able to understand written materials but does have a significant learning disability.     Patient reported the following relationship history never .  Patient's current relationship status is partnered with girlfriend of 9 months.   Patient identified their sexual orientation as heterosexual. .  Patient reported having 0  child(darcy). Patient identified family and girlfriend  as part of their support system.  Patient identified the quality of these relationships as good.       Patient's current living/housing situation involves  lives alone as he just moved out from his parents house.  The immediate members of family and household include Kathi (mom) and they report that housing is stable.     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  Patient reports their finances are obtained through savings . Patient does  identify finances as a current stressor.      Firearms/Weapons Access: No: Patient denies   Service: No    Legal History:  Patient reported that they   been involved with the legal system.  Charged counter fitting, replica gun BB possession, and theft.  .Pt is currently under probation [facility] 5 months supervised and then another 7 months unsupervised     Significant Losses / Trauma / Abuse / Neglect Issues:  There are indications or report of significant loss, trauma, abuse or neglect issues related to: Pt identifies his engagement with police interactions were traumatic  .   Issues of possible neglect are not present.       Comprehensive Examination (limited due to virtual visit format, phone/video):  Vital Signs:  Vitals: There were no vitals taken for this visit.  General/Constitutional:  Appearance: awake, alert, adequately groomed, appeared stated age and no  apparent distress  Attitude:  cooperative, pleasant   Eye Contact:  good  Musculoskeletal:  Muscle Strength and Tone: no gross abnormalities by observation  Psychomotor Behavior:  no evidence of tardive dyskinesia, dystonia, or tics  Gait and Station: normal, no gross abnormalities noted by observation  Psychiatric:  Speech:  clear, coherent, regular rate, rhythm, and volume  Associations:  no loose associations  Thought Process:  logical, linear and goal oriented  Thought Content:  no evidence of suicidal ideation or homicidal ideation, no evidence of psychotic thought, no auditory hallucinations present and no visual hallucinations present  Mood:   ok  Affect:  appropriate and in normal range and mood congruent  Insight:  fair  Judgment:  intact, adequate for safety  Impulse Control:  intact  Neurological:  Oriented to:  person, place, time, and situation  Attention Span and Concentration:  normal  Language: intact  Recent and Remote Memory:  Intact to interview. Not formally assessed. No amnesia.  Fund of Knowledge: appropriate    Strengths and Opportunities Per [facility] Puja Lees [facility], during today's team-based visit:  Patient identified the following strengths or resources that will help them succeed in treatment: friends / good social support, insight, intelligence, and motivation. Things that may interfere with the patient's success in treatment include: none identified.     Suicide Risk Assessment:  Today Jhoan Epstein reports no suicidal ideation. Based on all available evidence including the factors cited above, Jhoan Epstein does not appear to be at imminent risk for self-harm, does not meet criteria for a 72-hr hold, and therefore remains appropriate for ongoing outpatient level of care.  A thorough assessment of risk factors related to suicide and self-harm have been reviewed and are noted above. The patient convincingly denies acute suicidality on several occasions. Local [facility] safety  resources were provided for patient to use if needed. There was no deceit detected, and the patient presented in a manner that was believable.     DSM5  Diagnosis:  Attention-Deficit/Hyperactivity Disorder  314.01 (F90.9) Unspecified Attention -Deficit / Hyperactivity Disorder  Substance-Related & Addictive Disorders 292.9 (F12.99) Unspecified Cannabis Related Disorder    Medical Comorbidities Include:   Patient Active Problem List    Diagnosis Date Noted    Acute pain of left knee 07/08/2020     Priority: Medium     Added automatically from request for surgery [id number]      Old complex tear of lateral meniscus of left knee 07/08/2020     Priority: Medium     Added automatically from request for surgery [id number]      Epistaxis 11/19/2019     Priority: Medium    Complex tear of lateral meniscus of left knee as current injury 10/07/2019     Priority: Medium    Common wart 06/21/2016     Priority: Medium    Attention deficit hyperactivity disorder (ADHD) 05/01/2014     Priority: Medium     On Focalin short acting he did have tics.  Then tried concerta:  Made tics worse and he did fell like he did not concentrate well on it.    Problem list name updated by automated process. 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  Patient with long history of struggles with ADHD symptoms dating back to adolescence.  Struggled quite a bit in school.  Patient also with history of legal troubles.  Has a history of stimulant use about 10 years ago.  Due to current heavy cannabis use, I do not want to start a stimulant medication.  Patient is going to continue to work on cutting back on cannabis.  Declined need for chemical dependency resources at this time.  If patient  struggles on own with limited support to decrease and stop cannabis, could consider these resources.  Psychotherapy would be encouraged for additional support and ongoing development of nonpharmacologic coping skills.  We did discuss nonstimulant options.  Discussed starting Wellbutrin XL.  Patient agreeable to a trial.  No history of seizures.  Advised not to drink heavy on the medication due to increase seizure risks.  Could consider Strattera in the future or also clonidine or guanfacine if continues to struggle with sleep.  Could also consider trazodone.  No acute safety concerns.  No SI.      Medication side effects and alternatives reviewed. Health promotion activities recommended and reviewed today. All questions addressed. Education and counseling completed regarding risks and benefits of medications and psychotherapy options. Recommend therapy for additional support.     Treatment Plan:  Start Wellbutrin XL for mood, ADHD: Take 150 mg daily for about 1 week then can increase up to 300 mg daily as tolerated.  Continue to work on goal of abstinence from cannabis.  Recommend cessation of all mood altering substances.  Let us know if you decide you would like chemical dependency resources for assistance with cessation of cannabis.  Strongly recommend individual psychotherapy for additional support and ongoing development of nonpharmacologic coping skills and strategies.  Continue all other cares per primary care provider.   Continue all other medications as reviewed per electronic medical record today.   Safety plan reviewed. To the Emergency Department as needed or call after hours crisis line at [phone] or [phone]. [place] Crisis Text Line: Text [place] to 430753  or  Suicide LifeLine Chat: suicidepreventionlifeline.org/chat  Schedule an appointment with me in 6 weeks or sooner as needed.  Call [facility] at [phone] to schedule.  Follow up with primary care provider as planned or  sooner if needed for acute medical concerns.  Call the psychiatric nurse line with medication questions or concerns at [phone].  VEEDIMShart may be used to communicate with your provider, but this is not intended to be used for emergencies.    Patient Education:  Good ADHD resources:  Books-Mastering Adult ADHD, Driven to Distraction, Take Charge of Adult ADHD  Website-www.[employer]    Care team has reviewed attendance agreement with patient. Patient advised that two failed appointments within 6 months may lead to termination of current episode of care.     Community Resources:    National Suicide Prevention Lifeline: 805.519.2083 (TTY: 929.557.6193). Call anytime for help.  (www.suicidepreventionlifeline.org)  National [place] on Mental Illness (www.tawny.org): 385.314.1502 or 877-240-6529.   Mental Health Association (www.mentalhealth.org): 849.736.1008 or 984-559-7366.  [place] Crisis Text Line: Text [place] to 154102  Suicide LifeLine Chat: [employer]org/chat    Administrative Billing:   Phone Call/Video Duration: 17 Minutes  Start: 10:48a  Stop: 11:05a    The longitudinal plan of care for the diagnosis(es)/condition(s) as documented were addressed during this visit. Due to the added complexity in care, I will continue to support Jhoan in the subsequent management and with ongoing continuity of care.    Episode of Care #: 1    Patient Status:  Patient will continue to be seen for ongoing consultation and stabilization.    Signed:   Rachael Tang DO  [facility]    Disclaimer: This note consists of symbols derived from keyboarding, dictation and/or voice recognition software. As a result, there may be errors in the script that have gone undetected. Please consider this when interpreting information found in this chart.

## 2024-08-21 NOTE — NURSING NOTE
Current patient location: [id number] [place] [address] [address] [address]    Is the patient currently in the state of [place]? YES    Visit mode:VIDEO    If the visit is dropped, the patient can be reconnected by: VIDEO VISIT: Text to cell phone:   Telephone Information:   Mobile [phone]       Will anyone else be joining the visit? NO  (If patient encounters technical issues they should call [phone]    How would you like to obtain your AVS? MyChart    Are changes needed to the allergy or medication list? Pt stated no changes to allergies    Patient is currently not taking any medications.    Are refills needed on medications prescribed by this physician? NO    Rooming Documentation:  Questionnaire(s) completed Attendance guidelines (MH&A only)      Reason for visit: Consult    [facility]    Care team has reviewed attendance agreement with patient. Patient advised that two failed appointments within 6 months may lead to termination of current episode of care.

## 2024-08-29 ENCOUNTER — MYC MEDICAL ADVICE (OUTPATIENT)
Dept: PSYCHIATRY | Facility: CLINIC | Age: 19
End: 2024-08-29
Payer: COMMERCIAL

## 2024-08-29 NOTE — TELEPHONE ENCOUNTER
8/21/2024 treatment plan -  Impression:  Jhoan Epstein is a 19-year-old with past psychiatric history including ADHD and cannabis dependence who presents today for psychiatric evaluation.  Patient with long history of struggles with ADHD symptoms dating back to adolescence.  Struggled quite a bit in school.  Patient also with history of legal troubles.  Has a history of stimulant use about 10 years ago.  Due to current heavy cannabis use, I do not want to start a stimulant medication.  Patient is going to continue to work on cutting back on cannabis.  Declined need for chemical dependency resources at this time.  If patient struggles on own with limited support to decrease and stop cannabis, could consider these resources.  Psychotherapy would be encouraged for additional support and ongoing development of nonpharmacologic coping skills.  We did discuss nonstimulant options.  Discussed starting Wellbutrin XL.  Patient agreeable to a trial.  No history of seizures.  Advised not to drink heavy on the medication due to increase seizure risks.  Could consider Strattera in the future or also clonidine or guanfacine if continues to struggle with sleep.  Could also consider trazodone.  No acute safety concerns.  No SI.       Medication side effects and alternatives reviewed. Health promotion activities recommended and reviewed today. All questions addressed. Education and counseling completed regarding risks and benefits of medications and psychotherapy options. Recommend therapy for additional support.      Treatment Plan:  Start Wellbutrin XL for mood, ADHD: Take 150 mg daily for about 1 week then can increase up to 300 mg daily as tolerated.  Continue to work on goal of abstinence from cannabis.  Recommend cessation of all mood altering substances.  Let us know if you decide you would like chemical dependency resources for assistance with cessation of cannabis.  Strongly recommend individual psychotherapy for  additional support and ongoing development of nonpharmacologic coping skills and strategies.  Continue all other cares per primary care provider.   Continue all other medications as reviewed per electronic medical record today.   Safety plan reviewed. To the Emergency Department as needed or call after hours crisis line at [phone] or [phone]. [place] Crisis Text Line: Text [place] to 726288  or  Suicide LifeLine Chat: suicidepreventionInformation Development Consultantsline.org/chat  Schedule an appointment with me in 6 weeks or sooner as needed.  Call [facility] at [phone] to schedule.  Follow up with primary care provider as planned or sooner if needed for acute medical concerns.  Call the psychiatric nurse line with medication questions or concerns at [phone].  [employer] may be used to communicate with your provider, but this is not intended to be used for emergencies.

## 2025-02-10 ENCOUNTER — MYC MEDICAL ADVICE (OUTPATIENT)
Dept: PSYCHIATRY | Facility: CLINIC | Age: 20
End: 2025-02-10
Payer: COMMERCIAL

## 2025-02-10 NOTE — TELEPHONE ENCOUNTER
Patient requesting blood test since he is off of marijuana and to help him be prescribed for a medication for his ADHD.    RN acknowledged message. Routing to provider for review upon return to office.     Carolin Mirza RN on 2/10/2025 at 11:54 AM

## 2025-02-13 ENCOUNTER — TELEPHONE (OUTPATIENT)
Dept: FAMILY MEDICINE | Facility: CLINIC | Age: 20
End: 2025-02-13
Payer: COMMERCIAL

## 2025-02-13 NOTE — TELEPHONE ENCOUNTER
Mom is calling to ask how patient can get in with a cardiologist without having to see PCP.     Discussed options. Can go to ER if symptomatic, most ERs in [place] will place referrals when needed. Can call around to cardiology offices to see if they accept new patients without the referral. Otherwise will need an appointment of some sort for referral. Patient does have appointment scheduled for tomorrow.     Mom is asking to be seen today. Transferred to centralized schedulers. Sathya Garcia RN, BSN

## 2025-02-14 ENCOUNTER — ANCILLARY PROCEDURE (OUTPATIENT)
Dept: GENERAL RADIOLOGY | Facility: CLINIC | Age: 20
End: 2025-02-14
Attending: PHYSICIAN ASSISTANT
Payer: COMMERCIAL

## 2025-02-14 DIAGNOSIS — R00.2 PALPITATIONS: ICD-10-CM

## 2025-02-14 PROCEDURE — 71046 X-RAY EXAM CHEST 2 VIEWS: CPT | Mod: TC | Performed by: RADIOLOGY

## 2025-02-18 ENCOUNTER — OFFICE VISIT (OUTPATIENT)
Dept: CARDIOLOGY | Facility: CLINIC | Age: 20
End: 2025-02-18
Attending: PHYSICIAN ASSISTANT
Payer: COMMERCIAL

## 2025-02-18 VITALS
DIASTOLIC BLOOD PRESSURE: 64 MMHG | BODY MASS INDEX: 20.64 KG/M2 | SYSTOLIC BLOOD PRESSURE: 110 MMHG | HEART RATE: 82 BPM | HEIGHT: 75 IN | RESPIRATION RATE: 18 BRPM | WEIGHT: 166 LBS

## 2025-02-18 DIAGNOSIS — R00.2 PALPITATIONS: ICD-10-CM

## 2025-02-18 DIAGNOSIS — R55 PRE-SYNCOPE: Primary | ICD-10-CM

## 2025-02-18 PROCEDURE — 99204 OFFICE O/P NEW MOD 45 MIN: CPT | Performed by: INTERNAL MEDICINE

## 2025-02-18 NOTE — PROGRESS NOTES
[facility]   [address], [place] [address]   [email or web address]   OFFICE: [phone]          Thank you Dr. Larios for asking the [facility] team to participate in the care of your patient, Jhoan Epstein.     Impression and Plan       1.  Palpitations/tachycardia/presyncope-syncope.  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  At other times, he states that his heart rate although faster is quite a bit slower than the aforementioned 170 bpm.  ECG does not reveal any evidence of preexcitation.  Jhoan already has a Zio patch ordered through primary provider and we will review results when available.  Will obtain echocardiogram to rule out any structural heart disease.  I did advise trial of Valsalva type maneuvers should he have recurrence to see if this ameliorate/terminates the tachycardia.    Follow-up and further recommendations pending test results.    35 minutes spent reviewing prior records (including documentation, laboratory studies, cardiac testing/imaging), interview with patient along with physical exam, planning, and subsequent documentation/crafting of note).           History of Present Illness    Once again I would like to thank you again for asking me to participate in the care of your patient, Jhoan Epstein.  As you know, but to reiterate for my own records, Jhoan Epstein is a 19 year old male with palpitations.  He had reported onset of palpitations August 2024.  He indicates that he has noted his heart rate going up to 170 bpm.  He has had some associated lightheadedness and did have a fainting spell.  He had reported caffeine seem to be a trigger and has scaled this back.    The frequency of his symptoms will vary but has had lately upwards of 2 episodes a week.    Further review of systems is otherwise  "negative/noncontributory (medical record and 13 point review of systems reviewed as well and pertinent positives noted).         Cardiac Diagnostics        Twelve-lead ECG (personally reviewed) 14 February 2025: Sinus rhythm.  Normal ECG.    Chest radiograph 14 February 2025:  No acute cardiopulmonary process.  Normal cardiomediastinal silhouette.           Physical Examination       /64 (BP Location: Left arm, Patient Position: Sitting, Cuff Size: Adult Regular)   Pulse 82   Resp 18   Ht 1.905 m (6' 3\")   Wt 75.3 kg (166 lb)   BMI 20.75 kg/m          Wt Readings from Last 3 Encounters:   02/18/25 75.3 kg (166 lb)   02/14/25 72.1 kg (159 lb)   08/05/24 78.1 kg (172 lb 3.2 oz)       The patient is alert and oriented times three. Sclerae are anicteric. Mucosal membranes are moist. Jugular venous pressure is normal. No significant adenopathy/thyromegally appreciated. Lungs are clear with good expansion. On cardiovascular exam, the patient has a regular S1 and S2. Abdomen is soft and non-tender. Extremities reveal no clubbing, cyanosis, or edema.         Family History/Social History/Risk Factors   Patient does not smoke.  Family history reviewed, and family history includes Alcohol/Drug in his paternal grandmother; Cerebrovascular Disease in his maternal grandfather; Heart Disease in his maternal grandfather and mother.          Medical History  Surgical History Family History Social History   Past Medical History:   Diagnosis Date    Cellulitis and abscess of buttock 4/5/2011    NO ACTIVE PROBLEMS      Past Surgical History:   Procedure Laterality Date    ARTHROSCOPY KNEE WITH MENISCAL REPAIR Left 8/27/2020    Procedure: REPAIR, Lateral MENISCUS, KNEE, ARTHROSCOPIC , fibrin clot enhancement of repair;  Surgeon: Khari Fuller MD;  Location: [facility] OR    NO HISTORY OF SURGERY       Family History   Problem Relation Age of Onset    Heart Disease Mother         SVT had oblation 2013    Cerebrovascular Disease " Maternal Grandfather     Heart Disease Maternal Grandfather     Alcohol/Drug Paternal Grandmother     Unknown/Adopted No family hx of     Family History Negative No family hx of     Asthma No family hx of     C.A.D. No family hx of     Diabetes No family hx of     Hypertension No family hx of     Breast Cancer No family hx of     Cancer - colorectal No family hx of     Prostate Cancer No family hx of     Allergies No family hx of     Alzheimer Disease No family hx of     Anesthesia Reaction No family hx of     Arthritis No family hx of     Blood Disease No family hx of     Cancer No family hx of     Cardiovascular No family hx of     Circulatory No family hx of     Congenital Anomalies No family hx of     Connective Tissue Disorder No family hx of     Depression No family hx of     Endocrine Disease No family hx of     Eye Disorder No family hx of     Genetic Disorder No family hx of     Gastrointestinal Disease No family hx of     Genitourinary Problems No family hx of     Gynecology No family hx of     Lipids No family hx of     Musculoskeletal Disorder No family hx of     Neurologic Disorder No family hx of     Obesity No family hx of     Osteoporosis No family hx of     Psychotic Disorder No family hx of     Respiratory No family hx of     Thyroid Disease No family hx of     Hearing Loss No family hx of     Coronary Artery Disease No family hx of     Hyperlipidemia No family hx of     Ovarian Cancer No family hx of     Depression/Anxiety No family hx of     Thyroid Disease No family hx of     Chemical Addiction No family hx of     Known Genetic Syndrome No family hx of         Social History     Socioeconomic History    Marital status: Single     Spouse name: Not on file    Number of children: Not on file    Years of education: Not on file    Highest education level: Not on file   Occupational History    Not on file   Tobacco Use    Smoking status: Some Days     Types: Other    Smokeless tobacco: Never     Tobacco comments:     Marijuana   Vaping Use    Vaping status: Never Used   Substance and Sexual Activity    Alcohol use: No    Drug use: No    Sexual activity: Never   Other Topics Concern    Not on file   Social History Narrative    Not on file     Social Drivers of Health     Financial Resource Strain: Unknown (1/12/2024)    Financial Resource Strain     Within the past 12 months, have you or your family members you live with been unable to get utilities (heat, electricity) when it was really needed?: Patient declined   Food Insecurity: Low Risk  (1/12/2024)    Food Insecurity     Within the past 12 months, did you worry that your food would run out before you got money to buy more?: No     Within the past 12 months, did the food you bought just not last and you didn t have money to get more?: Patient declined   Transportation Needs: Unknown (1/12/2024)    Transportation Needs     Within the past 12 months, has lack of transportation kept you from medical appointments, getting your medicines, non-medical meetings or appointments, work, or from getting things that you need?: Patient declined   Physical Activity: Not on file   Stress: Not on file   Social Connections: Not on file   Interpersonal Safety: Low Risk  (5/17/2024)    Interpersonal Safety     Do you feel physically and emotionally safe where you currently live?: Yes     Within the past 12 months, have you been hit, slapped, kicked or otherwise physically hurt by someone?: No     Within the past 12 months, have you been humiliated or emotionally abused in other ways by your partner or ex-partner?: No   Housing Stability: Unknown (1/12/2024)    Housing Stability     Do you have housing? : Patient declined     Are you worried about losing your housing?: Patient declined           Medications  Allergies   Current Outpatient Medications   Medication Sig Dispense Refill    EPINEPHrine (ANY BX GENERIC EQUIV) 0.3 MG/0.3ML injection 2-pack Inject 0.3 mLs (0.3 mg)  "into the muscle as needed for anaphylaxis (Patient not taking: Reported on 2/18/2025) 0.6 mL 1    ketoconazole (NIZORAL) 2 % external shampoo Use daily as needed for dandruff. Apply and wait 5-10 minutes before rinsing. (Patient not taking: Reported on 2/18/2025) 120 mL 1    MAGNESIUM OXIDE PO  (Patient not taking: Reported on 2/18/2025)      ondansetron (ZOFRAN ODT) 4 MG ODT tab Take 1 tablet (4 mg) by mouth every 12 hours as needed for nausea or vomiting (Patient not taking: Reported on 2/18/2025) 8 tablet 0    Probiotic Product (PRO-BIOTIC BLEND PO)  (Patient not taking: Reported on 2/18/2025)         Allergies   Allergen Reactions    Bactrim [Sulfamethoxazole W-Trimethoprim]      Fever happened all 3 times he was on the Bactrim ; vomited    Bee Venom           Lab Results    Chemistry/lipid CBC Cardiac Enzymes/BNP/TSH/INR   No results for input(s): \"CHOL\", \"HDL\", \"LDL\", \"TRIG\", \"CHOLHDLRATIO\" in the last 62458 hours.  No results for input(s): \"LDL\" in the last 90061 hours.  Recent Labs   Lab Test 02/14/25  1242      POTASSIUM 4.2   CHLORIDE 102   CO2 23   GLC 95   BUN 17.7   CR 0.91   GFRESTIMATED >90   AZALEA 10.2     Recent Labs   Lab Test 02/14/25  1242   CR 0.91     No results for input(s): \"A1C\" in the last 17656 hours.       Recent Labs   Lab Test 02/14/25  1242   WBC 7.6   HGB 16.0   HCT 45.9   MCV 87        Recent Labs   Lab Test 02/14/25  1242 08/13/18  1012   HGB 16.0 13.8    No results for input(s): \"TROPONINI\" in the last 16521 hours.  No results for input(s): \"BNP\", \"NTBNPI\", \"NTBNP\" in the last 19591 hours.  Recent Labs   Lab Test 02/14/25  1242   TSH 0.87     No results for input(s): \"INR\" in the last 86435 hours.       Medications  Allergies   Current Outpatient Medications   Medication Sig Dispense Refill    EPINEPHrine (ANY BX GENERIC EQUIV) 0.3 MG/0.3ML injection 2-pack Inject 0.3 mLs (0.3 mg) into the muscle as needed for anaphylaxis (Patient not taking: Reported on 2/18/2025) 0.6 " "mL 1    ketoconazole (NIZORAL) 2 % external shampoo Use daily as needed for dandruff. Apply and wait 5-10 minutes before rinsing. (Patient not taking: Reported on 2/18/2025) 120 mL 1    MAGNESIUM OXIDE PO  (Patient not taking: Reported on 2/18/2025)      ondansetron (ZOFRAN ODT) 4 MG ODT tab Take 1 tablet (4 mg) by mouth every 12 hours as needed for nausea or vomiting (Patient not taking: Reported on 2/18/2025) 8 tablet 0    Probiotic Product (PRO-BIOTIC BLEND PO)  (Patient not taking: Reported on 2/18/2025)        Allergies   Allergen Reactions    Bactrim [Sulfamethoxazole W-Trimethoprim]      Fever happened all 3 times he was on the Bactrim ; vomited    Bee Venom           Lab Results   Lab Results   Component Value Date     02/14/2025     08/18/2011    CO2 23 02/14/2025    CO2 22 08/18/2011    BUN 17.7 02/14/2025    BUN 17 08/18/2011     Lab Results   Component Value Date    WBC 7.6 02/14/2025    WBC 3.5 08/13/2018    HGB 16.0 02/14/2025    HGB 13.8 08/13/2018    HCT 45.9 02/14/2025    HCT 39.9 08/13/2018    MCV 87 02/14/2025    MCV 85 08/13/2018     02/14/2025     08/13/2018     No results found for: \"CHOL\", \"TRIG\", \"HDL\", \"LDLDIRECT\"  No results found for: \"INR\"  No results found for: \"BNP\"  No results found for: \"CKTOTAL\", \"CKMB\", \"TROPONINI\"  Lab Results   Component Value Date    TSH 0.87 02/14/2025                  "

## 2025-02-18 NOTE — LETTER
2/18/2025    Zach Perez PA-C  [id number] [facility] [address]    RE: Jhoan Epstein       Dear Colleague,     I had the pleasure of seeing Jhoan Epstein in the [facility].         [facility]   [address], [place] [address]   [email or web address]   OFFICE: [phone]          Thank you Dr. Larios for asking the [facility] team to participate in the care of your patient, Jhoan Epstein.     Impression and Plan       1.  Palpitations/tachycardia/presyncope-syncope.  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  At other times, he states that his heart rate although faster is quite a bit slower than the aforementioned 170 bpm.  ECG does not reveal any evidence of preexcitation.  Jhoan already has a Zio patch ordered through primary provider and we will review results when available.  Will obtain echocardiogram to rule out any structural heart disease.  I did advise trial of Valsalva type maneuvers should he have recurrence to see if this ameliorate/terminates the tachycardia.    Follow-up and further recommendations pending test results.    35 minutes spent reviewing prior records (including documentation, laboratory studies, cardiac testing/imaging), interview with patient along with physical exam, planning, and subsequent documentation/crafting of note).           History of Present Illness    Once again I would like to thank you again for asking me to participate in the care of your patient, Jhoan Epstein.  As you know, but to reiterate for my own records, Jhoan Epstein is a 19 year old male with palpitations.  He had reported onset of palpitations August 2024.  He indicates that he has noted his heart rate going up to 170 bpm.  He has had some associated lightheadedness and did have a fainting spell.  He had reported  "caffeine seem to be a trigger and has scaled this back.    The frequency of his symptoms will vary but has had lately upwards of 2 episodes a week.    Further review of systems is otherwise negative/noncontributory (medical record and 13 point review of systems reviewed as well and pertinent positives noted).         Cardiac Diagnostics        Twelve-lead ECG (personally reviewed) 14 February 2025: Sinus rhythm.  Normal ECG.    Chest radiograph 14 February 2025:  No acute cardiopulmonary process.  Normal cardiomediastinal silhouette.           Physical Examination       /64 (BP Location: Left arm, Patient Position: Sitting, Cuff Size: Adult Regular)   Pulse 82   Resp 18   Ht 1.905 m (6' 3\")   Wt 75.3 kg (166 lb)   BMI 20.75 kg/m          Wt Readings from Last 3 Encounters:   02/18/25 75.3 kg (166 lb)   02/14/25 72.1 kg (159 lb)   08/05/24 78.1 kg (172 lb 3.2 oz)       The patient is alert and oriented times three. Sclerae are anicteric. Mucosal membranes are moist. Jugular venous pressure is normal. No significant adenopathy/thyromegally appreciated. Lungs are clear with good expansion. On cardiovascular exam, the patient has a regular S1 and S2. Abdomen is soft and non-tender. Extremities reveal no clubbing, cyanosis, or edema.         Family History/Social History/Risk Factors   Patient does not smoke.  Family history reviewed, and family history includes Alcohol/Drug in his paternal grandmother; Cerebrovascular Disease in his maternal grandfather; Heart Disease in his maternal grandfather and mother.          Medical History  Surgical History Family History Social History   Past Medical History:   Diagnosis Date     Cellulitis and abscess of buttock 4/5/2011     NO ACTIVE PROBLEMS      Past Surgical History:   Procedure Laterality Date     ARTHROSCOPY KNEE WITH MENISCAL REPAIR Left 8/27/2020    Procedure: REPAIR, Lateral MENISCUS, KNEE, ARTHROSCOPIC , fibrin clot enhancement of repair;  Surgeon: " Khari Fuller MD;  Location: [facility] OR     NO HISTORY OF SURGERY       Family History   Problem Relation Age of Onset     Heart Disease Mother         SVT had oblation 2013     Cerebrovascular Disease Maternal Grandfather      Heart Disease Maternal Grandfather      Alcohol/Drug Paternal Grandmother      Unknown/Adopted No family hx of      Family History Negative No family hx of      Asthma No family hx of      C.A.D. No family hx of      Diabetes No family hx of      Hypertension No family hx of      Breast Cancer No family hx of      Cancer - colorectal No family hx of      Prostate Cancer No family hx of      Allergies No family hx of      Alzheimer Disease No family hx of      Anesthesia Reaction No family hx of      Arthritis No family hx of      Blood Disease No family hx of      Cancer No family hx of      Cardiovascular No family hx of      Circulatory No family hx of      Congenital Anomalies No family hx of      Connective Tissue Disorder No family hx of      Depression No family hx of      Endocrine Disease No family hx of      Eye Disorder No family hx of      Genetic Disorder No family hx of      Gastrointestinal Disease No family hx of      Genitourinary Problems No family hx of      Gynecology No family hx of      Lipids No family hx of      Musculoskeletal Disorder No family hx of      Neurologic Disorder No family hx of      Obesity No family hx of      Osteoporosis No family hx of      Psychotic Disorder No family hx of      Respiratory No family hx of      Thyroid Disease No family hx of      Hearing Loss No family hx of      Coronary Artery Disease No family hx of      Hyperlipidemia No family hx of      Ovarian Cancer No family hx of      Depression/Anxiety No family hx of      Thyroid Disease No family hx of      Chemical Addiction No family hx of      Known Genetic Syndrome No family hx of         Social History     Socioeconomic History     Marital status: Single     Spouse name: Not on  file     Number of children: Not on file     Years of education: Not on file     Highest education level: Not on file   Occupational History     Not on file   Tobacco Use     Smoking status: Some Days     Types: Other     Smokeless tobacco: Never     Tobacco comments:     Marijuana   Vaping Use     Vaping status: Never Used   Substance and Sexual Activity     Alcohol use: No     Drug use: No     Sexual activity: Never   Other Topics Concern     Not on file   Social History Narrative     Not on file     Social Drivers of Health     Financial Resource Strain: Unknown (1/12/2024)    Financial Resource Strain      Within the past 12 months, have you or your family members you live with been unable to get utilities (heat, electricity) when it was really needed?: Patient declined   Food Insecurity: Low Risk  (1/12/2024)    Food Insecurity      Within the past 12 months, did you worry that your food would run out before you got money to buy more?: No      Within the past 12 months, did the food you bought just not last and you didn t have money to get more?: Patient declined   Transportation Needs: Unknown (1/12/2024)    Transportation Needs      Within the past 12 months, has lack of transportation kept you from medical appointments, getting your medicines, non-medical meetings or appointments, work, or from getting things that you need?: Patient declined   Physical Activity: Not on file   Stress: Not on file   Social Connections: Not on file   Interpersonal Safety: Low Risk  (5/17/2024)    Interpersonal Safety      Do you feel physically and emotionally safe where you currently live?: Yes      Within the past 12 months, have you been hit, slapped, kicked or otherwise physically hurt by someone?: No      Within the past 12 months, have you been humiliated or emotionally abused in other ways by your partner or ex-partner?: No   Housing Stability: Unknown (1/12/2024)    Housing Stability      Do you have housing? : Patient  "declined      Are you worried about losing your housing?: Patient declined           Medications  Allergies   Current Outpatient Medications   Medication Sig Dispense Refill     EPINEPHrine (ANY BX GENERIC EQUIV) 0.3 MG/0.3ML injection 2-pack Inject 0.3 mLs (0.3 mg) into the muscle as needed for anaphylaxis (Patient not taking: Reported on 2/18/2025) 0.6 mL 1     ketoconazole (NIZORAL) 2 % external shampoo Use daily as needed for dandruff. Apply and wait 5-10 minutes before rinsing. (Patient not taking: Reported on 2/18/2025) 120 mL 1     MAGNESIUM OXIDE PO  (Patient not taking: Reported on 2/18/2025)       ondansetron (ZOFRAN ODT) 4 MG ODT tab Take 1 tablet (4 mg) by mouth every 12 hours as needed for nausea or vomiting (Patient not taking: Reported on 2/18/2025) 8 tablet 0     Probiotic Product (PRO-BIOTIC BLEND PO)  (Patient not taking: Reported on 2/18/2025)         Allergies   Allergen Reactions     Bactrim [Sulfamethoxazole W-Trimethoprim]      Fever happened all 3 times he was on the Bactrim ; vomited     Bee Venom           Lab Results    Chemistry/lipid CBC Cardiac Enzymes/BNP/TSH/INR   No results for input(s): \"CHOL\", \"HDL\", \"LDL\", \"TRIG\", \"CHOLHDLRATIO\" in the last 15489 hours.  No results for input(s): \"LDL\" in the last 71961 hours.  Recent Labs   Lab Test 02/14/25  1242      POTASSIUM 4.2   CHLORIDE 102   CO2 23   GLC 95   BUN 17.7   CR 0.91   GFRESTIMATED >90   AZALEA 10.2     Recent Labs   Lab Test 02/14/25  1242   CR 0.91     No results for input(s): \"A1C\" in the last 61860 hours.       Recent Labs   Lab Test 02/14/25  1242   WBC 7.6   HGB 16.0   HCT 45.9   MCV 87        Recent Labs   Lab Test 02/14/25  1242 08/13/18  1012   HGB 16.0 13.8    No results for input(s): \"TROPONINI\" in the last 59280 hours.  No results for input(s): \"BNP\", \"NTBNPI\", \"NTBNP\" in the last 91359 hours.  Recent Labs   Lab Test 02/14/25  1242   TSH 0.87     No results for input(s): \"INR\" in the last 36597 hours. " "      Medications  Allergies   Current Outpatient Medications   Medication Sig Dispense Refill     EPINEPHrine (ANY BX GENERIC EQUIV) 0.3 MG/0.3ML injection 2-pack Inject 0.3 mLs (0.3 mg) into the muscle as needed for anaphylaxis (Patient not taking: Reported on 2/18/2025) 0.6 mL 1     ketoconazole (NIZORAL) 2 % external shampoo Use daily as needed for dandruff. Apply and wait 5-10 minutes before rinsing. (Patient not taking: Reported on 2/18/2025) 120 mL 1     MAGNESIUM OXIDE PO  (Patient not taking: Reported on 2/18/2025)       ondansetron (ZOFRAN ODT) 4 MG ODT tab Take 1 tablet (4 mg) by mouth every 12 hours as needed for nausea or vomiting (Patient not taking: Reported on 2/18/2025) 8 tablet 0     Probiotic Product (PRO-BIOTIC BLEND PO)  (Patient not taking: Reported on 2/18/2025)        Allergies   Allergen Reactions     Bactrim [Sulfamethoxazole W-Trimethoprim]      Fever happened all 3 times he was on the Bactrim ; vomited     Bee Venom           Lab Results   Lab Results   Component Value Date     02/14/2025     08/18/2011    CO2 23 02/14/2025    CO2 22 08/18/2011    BUN 17.7 02/14/2025    BUN 17 08/18/2011     Lab Results   Component Value Date    WBC 7.6 02/14/2025    WBC 3.5 08/13/2018    HGB 16.0 02/14/2025    HGB 13.8 08/13/2018    HCT 45.9 02/14/2025    HCT 39.9 08/13/2018    MCV 87 02/14/2025    MCV 85 08/13/2018     02/14/2025     08/13/2018     No results found for: \"CHOL\", \"TRIG\", \"HDL\", \"LDLDIRECT\"  No results found for: \"INR\"  No results found for: \"BNP\"  No results found for: \"CKTOTAL\", \"CKMB\", \"TROPONINI\"  Lab Results   Component Value Date    TSH 0.87 02/14/2025                      Thank you for allowing me to participate in the care of your patient.      Sincerely,     DarianEstefany Thacker MD     [facility]  cc:   Rocael Larios PA-C  [id number] [place] [address]      "

## 2025-02-21 ENCOUNTER — TELEPHONE (OUTPATIENT)
Dept: FAMILY MEDICINE | Facility: CLINIC | Age: 20
End: 2025-02-21
Payer: COMMERCIAL

## 2025-03-07 ENCOUNTER — HOSPITAL ENCOUNTER (OUTPATIENT)
Dept: CARDIOLOGY | Facility: HOSPITAL | Age: 20
Discharge: HOME | End: 2025-03-07
Attending: INTERNAL MEDICINE
Payer: COMMERCIAL

## 2025-03-07 DIAGNOSIS — R55 FAINTING: ICD-10-CM

## 2025-03-07 DIAGNOSIS — R55 PRE-SYNCOPE: ICD-10-CM

## 2025-03-07 DIAGNOSIS — R00.0 TACHYCARDIA: ICD-10-CM

## 2025-03-07 DIAGNOSIS — R00.2 PALPITATIONS: ICD-10-CM

## 2025-03-07 PROCEDURE — 999N000096 CARDIAC MOBILE TELEMETRY MONITOR

## 2025-04-09 DIAGNOSIS — R55 PRE-SYNCOPE: ICD-10-CM

## 2025-04-09 DIAGNOSIS — R00.2 PALPITATIONS: Primary | ICD-10-CM

## 2025-04-09 DIAGNOSIS — R55 SYNCOPE, UNSPECIFIED SYNCOPE TYPE: ICD-10-CM

## 2025-04-09 DIAGNOSIS — R00.0 TACHYCARDIA: ICD-10-CM

## 2025-07-13 ENCOUNTER — HEALTH MAINTENANCE LETTER (OUTPATIENT)
Age: 20
End: 2025-07-13

## 2025-07-27 ENCOUNTER — OFFICE VISIT (OUTPATIENT)
Dept: URGENT CARE | Facility: URGENT CARE | Age: 20
End: 2025-07-27
Payer: COMMERCIAL

## 2025-07-27 VITALS
TEMPERATURE: 97.5 F | OXYGEN SATURATION: 98 % | HEART RATE: 82 BPM | SYSTOLIC BLOOD PRESSURE: 126 MMHG | RESPIRATION RATE: 16 BRPM | BODY MASS INDEX: 20.25 KG/M2 | DIASTOLIC BLOOD PRESSURE: 82 MMHG | WEIGHT: 162 LBS

## 2025-07-27 DIAGNOSIS — J02.9 SORE THROAT: Primary | ICD-10-CM

## 2025-07-27 DIAGNOSIS — J02.9 VIRAL PHARYNGITIS: ICD-10-CM

## 2025-07-27 LAB — DEPRECATED S PYO AG THROAT QL EIA: NEGATIVE

## 2025-07-27 PROCEDURE — 87651 STREP A DNA AMP PROBE: CPT | Performed by: NURSE PRACTITIONER

## 2025-07-27 PROCEDURE — 1125F AMNT PAIN NOTED PAIN PRSNT: CPT | Performed by: NURSE PRACTITIONER

## 2025-07-27 PROCEDURE — 3079F DIAST BP 80-89 MM HG: CPT | Performed by: NURSE PRACTITIONER

## 2025-07-27 PROCEDURE — 99213 OFFICE O/P EST LOW 20 MIN: CPT | Performed by: NURSE PRACTITIONER

## 2025-07-27 PROCEDURE — 3074F SYST BP LT 130 MM HG: CPT | Performed by: NURSE PRACTITIONER

## 2025-07-27 ASSESSMENT — PAIN SCALES - GENERAL: PAINLEVEL_OUTOF10: MODERATE PAIN (4)

## 2025-07-27 NOTE — PROGRESS NOTES
SUBJECTIVE:   Jhoan Epstein  is a 20 year old male who is here today because of: Sore Throat.  The patient has had symptoms of sore throat.   Onset of symptoms was 1 day ago. Course of illness is same.  Patient denies exposure to illness at home or work/school.   Patient denies none      Past Medical History:   Diagnosis Date    Cellulitis and abscess of buttock 4/5/2011    NO ACTIVE PROBLEMS        Social History     Tobacco Use    Smoking status: Some Days     Types: Other    Smokeless tobacco: Never    Tobacco comments:     Marijuana   Vaping Use    Vaping status: Never Used   Substance Use Topics    Alcohol use: No    Drug use: No       ROS:  Review of systems negative except as stated above.      OBJECTIVE:   /82 (BP Location: Left arm, Patient Position: Sitting, Cuff Size: Adult Regular)   Pulse 82   Temp 97.5  F (36.4  C) (Oral)   Resp 16   Wt 73.5 kg (162 lb)   SpO2 98%   BMI 20.25 kg/m    General: healthy, alert and no distress  Eyes - conjunctivae clear.  Ears - External ears normal. Canals clear. TM's normal.  Nose/Sinuses - Nares normal.Mucosa normal. No drainage or sinus tenderness.  Oropharynx - Lips, mucosa, and tongueOr normal. Negative oropharyngeal erythema, no  tonsillar hypertrophy no  exudates present,  Lungs - Lungs clear; no wheezing or rales.  Heart - regular rate and rhythm. No murmurs, rub.    Labs:  Results for orders placed or performed in visit on 07/27/25   Streptococcus A Rapid Screen w/Reflex to PCR - Clinic Collect     Status: Normal    Specimen: Throat; Swab   Result Value Ref Range    Group A Strep antigen Negative Negative         ASSESSMENT:     ICD-10-CM    1. Sore throat  J02.9 Streptococcus A Rapid Screen w/Reflex to PCR - Clinic Collect     Group A Streptococcus PCR Throat Swab      2. Viral pharyngitis  J02.9           PLAN:  Symptomatic treatment with fluids, rest.  May use acetaminophen, ibuprofen prn.  Patient may return to work/school after 24 hours of  antibiotic treatment and symptoms have improved.  RTC if any worsening symptoms or if not improving.     Veronica Villavicencio CNP

## 2025-07-27 NOTE — PROGRESS NOTES
Urgent Care Clinic Visit    Chief Complaint   Patient presents with    Pharyngitis     X one day               7/27/2025     5:58 PM   Additional Questions   Roomed by Arren   Accompanied by Girlfriend     Does the patient have a sore throat and either history of fever >100.4 in the previous 24 hours or recent exposure to a known case of strep throat? Yes  Does the patient have a productive cough that started within the past 7 days? No

## 2025-07-28 LAB — S PYO DNA THROAT QL NAA+PROBE: NOT DETECTED

## (undated) DEVICE — Device

## (undated) DEVICE — PREP CHLORAPREP 26ML TINTED ORANGE  260815

## (undated) DEVICE — ESU PENCIL SMOKE EVAC W/ROCKER SWITCH 0703-047-000

## (undated) DEVICE — GLOVE PROTEXIS W/NEU-THERA 8.0  2D73TE80

## (undated) DEVICE — ESU GROUND PAD ADULT W/CORD E7507

## (undated) DEVICE — PACK ARTHROSCOPY KNEE SOP15AKFSM

## (undated) DEVICE — GLOVE PROTEXIS BLUE W/NEU-THERA 7.5  2D73EB75

## (undated) DEVICE — GLOVE PROTEXIS W/NEU-THERA 7.5  2D73TE75

## (undated) DEVICE — DRSG STERI STRIP 1/2X4" R1547

## (undated) DEVICE — GLOVE PROTEXIS POWDER FREE 7.5 ORTHOPEDIC 2D73ET75

## (undated) DEVICE — DRAPE STERI U 1015

## (undated) DEVICE — SOL WATER IRRIG 1000ML BOTTLE 07139-09

## (undated) DEVICE — DRAPE SLEEVE 599

## (undated) DEVICE — BNDG ESMARK 6" STERILE

## (undated) DEVICE — BLADE DYONICS INCISOR PLUS ELITE 3.5MM 72200095

## (undated) DEVICE — SOL NACL 0.9% IRRIG 3000ML BAG 07972-08

## (undated) DEVICE — SU PROLENE 3-0 PS-2 18" 8687H

## (undated) DEVICE — GLOVE PROTEXIS POWDER FREE 8.0 ORTHOPEDIC 2D73ET80

## (undated) DEVICE — DRAPE SHEET 3/4 78X60"

## (undated) DEVICE — BNDG ELASTIC 6"X5YDS UNSTERILE 6611-60

## (undated) RX ORDER — PROPOFOL 10 MG/ML
INJECTION, EMULSION INTRAVENOUS
Status: DISPENSED
Start: 2020-08-27

## (undated) RX ORDER — ONDANSETRON 2 MG/ML
INJECTION INTRAMUSCULAR; INTRAVENOUS
Status: DISPENSED
Start: 2020-08-27

## (undated) RX ORDER — ACETAMINOPHEN 325 MG/1
TABLET ORAL
Status: DISPENSED
Start: 2020-08-27

## (undated) RX ORDER — OXYCODONE HYDROCHLORIDE 5 MG/1
TABLET ORAL
Status: DISPENSED
Start: 2020-08-27

## (undated) RX ORDER — FENTANYL CITRATE 50 UG/ML
INJECTION, SOLUTION INTRAMUSCULAR; INTRAVENOUS
Status: DISPENSED
Start: 2020-08-27

## (undated) RX ORDER — HYDROMORPHONE HYDROCHLORIDE 2 MG/ML
INJECTION, SOLUTION INTRAMUSCULAR; INTRAVENOUS; SUBCUTANEOUS
Status: DISPENSED
Start: 2020-08-27

## (undated) RX ORDER — LIDOCAINE HYDROCHLORIDE 20 MG/ML
INJECTION, SOLUTION INFILTRATION; PERINEURAL
Status: DISPENSED
Start: 2020-08-27

## (undated) RX ORDER — CEFAZOLIN SODIUM 1 G/3ML
INJECTION, POWDER, FOR SOLUTION INTRAMUSCULAR; INTRAVENOUS
Status: DISPENSED
Start: 2020-08-27

## (undated) RX ORDER — LIDOCAINE HYDROCHLORIDE 10 MG/ML
INJECTION, SOLUTION EPIDURAL; INFILTRATION; INTRACAUDAL; PERINEURAL
Status: DISPENSED
Start: 2020-08-27

## (undated) RX ORDER — BUPIVACAINE HYDROCHLORIDE 2.5 MG/ML
INJECTION, SOLUTION INFILTRATION; PERINEURAL
Status: DISPENSED
Start: 2020-08-27

## (undated) RX ORDER — DEXAMETHASONE SODIUM PHOSPHATE 4 MG/ML
INJECTION, SOLUTION INTRA-ARTICULAR; INTRALESIONAL; INTRAMUSCULAR; INTRAVENOUS; SOFT TISSUE
Status: DISPENSED
Start: 2020-08-27